# Patient Record
Sex: MALE | Race: WHITE | NOT HISPANIC OR LATINO | Employment: UNEMPLOYED | URBAN - METROPOLITAN AREA
[De-identification: names, ages, dates, MRNs, and addresses within clinical notes are randomized per-mention and may not be internally consistent; named-entity substitution may affect disease eponyms.]

---

## 2017-02-09 ENCOUNTER — E-VISIT (OUTPATIENT)
Dept: INTERNAL MEDICINE | Facility: CLINIC | Age: 52
End: 2017-02-09
Payer: COMMERCIAL

## 2017-02-09 ENCOUNTER — VIRTUAL VISIT (OUTPATIENT)
Dept: FAMILY MEDICINE | Facility: OTHER | Age: 52
End: 2017-02-09

## 2017-02-09 DIAGNOSIS — J01.01 ACUTE RECURRENT MAXILLARY SINUSITIS: Primary | ICD-10-CM

## 2017-02-09 PROCEDURE — 99444 ZZC PHYSICIAN ONLINE EVALUATION & MANAGEMENT SERVICE: CPT | Performed by: INTERNAL MEDICINE

## 2017-02-09 NOTE — PROGRESS NOTES
Date:   Clinician: Daisy Denson  Clinician NPI: [id number]  Patient: Chaparro Hanson  Patient : 1965  Patient Address: [address], [place] [address]  Patient Phone: [phone]  Visit Protocol: URI  Patient Summary:  Chaparro is a 52 year old ( : 1965 ) male who initiated a Zip for significant cough.      His symptoms started gradually 3-6 days ago and consist of malaise, nasal congestion, hoarse voice, cough, and post-nasal drainage.   He denies rhinitis, dyspnea, loss of appetite, facial pain or pressure, headache, fever, chills, ear pain, sore throat, chest pain, myalgias, itchy eyes, vomiting, nausea, and dysphagia. He denies a history of facial surgery.   His moderate nasal secretions are green. When asked to feel his neck he could not tell if lymph nodes were enlarged. He denies axillary lymphadenopathy.   Chaparro denies asthma. His severe (cough every 1-2 min) productive cough is more bothersome at night. He believes the cough is caused by post-nasal drainage. His cough produces green sputum.    Chaparro denies having COPD or other chronic lung disease.   Pulse: self-reported pulse rate as: 11 beats in 10 seconds.    Weight (in lbs): 215   Chaparro does not smoke or use smokeless tobacco.  MEDICATIONS:  Phenylephrine (Sudafed PE) and guaifenesin + dextromethorphan (Robitussin DM, Mytussin DM, Mucinex DM)   Patient free text response:  Sumatriptan 100mg  , ALLERGIES:   penicillin/amoxicillin/augmentin    Clinician Response:  Dear Chaparro,  Based on the information you have provided, I am unable to diagnose and treat you without additional information. Please be seen in a clinic or urgent care to determine the treatment that is best for you. You will not be charged for this Zip. Thanks for using Zip[employer].   Diagnosis: Unable to diagnose  Diagnosis ICD: R69  Additional Clinician Notes: Please be seen in the clinic for further evaluation. A provider will be able to listen to  your lungs to determine the best treatment for you.

## 2017-02-10 RX ORDER — DOXYCYCLINE 100 MG/1
100 CAPSULE ORAL 2 TIMES DAILY
Qty: 20 CAPSULE | Refills: 0 | Status: SHIPPED | OUTPATIENT
Start: 2017-02-10 | End: 2018-04-03

## 2017-02-10 RX ORDER — FLUTICASONE PROPIONATE 50 MCG
1-2 SPRAY, SUSPENSION (ML) NASAL DAILY
Qty: 1 BOTTLE | Refills: 11 | Status: SHIPPED | OUTPATIENT
Start: 2017-02-10 | End: 2020-08-24

## 2017-02-10 NOTE — TELEPHONE ENCOUNTER
Left V/m for pt letting him know that PCP escribed these to pharmacy and to f/u if not better after that

## 2017-02-10 NOTE — TELEPHONE ENCOUNTER
Will call in a nasal spray and antibiotic.   Needs follow up if not better.   Clinically symptoms of sinusitis.

## 2017-03-28 ENCOUNTER — OFFICE VISIT (OUTPATIENT)
Dept: PODIATRY | Facility: CLINIC | Age: 52
End: 2017-03-28
Payer: COMMERCIAL

## 2017-03-28 VITALS — HEIGHT: 74 IN | BODY MASS INDEX: 28.88 KG/M2 | WEIGHT: 225 LBS | HEART RATE: 78 BPM

## 2017-03-28 DIAGNOSIS — L60.0 INGROWING NAIL: Primary | ICD-10-CM

## 2017-03-28 PROCEDURE — 11719 TRIM NAIL(S) ANY NUMBER: CPT | Performed by: PODIATRIST

## 2017-03-28 PROCEDURE — 99212 OFFICE O/P EST SF 10 MIN: CPT | Mod: 25 | Performed by: PODIATRIST

## 2017-03-28 NOTE — MR AVS SNAPSHOT
"              After Visit Summary   3/28/2017    Chaparro Hanson    MRN: [id number]           Patient Information     Date Of Birth          1965        Visit Information        Provider Department      3/28/2017 10:15 AM Igor Peterson DPM [facility]        Today's Diagnoses     Ingrowing nail    -  1       Follow-ups after your visit        Who to contact     If you have questions or need follow up information about today's clinic visit or your schedule please contact [facility] directly at [phone].  Normal or non-critical lab and imaging results will be communicated to you by Upward Mobilityhart, letter or phone within 4 business days after the clinic has received the results. If you do not hear from us within 7 days, please contact the clinic through VeryLastRoomt or phone. If you have a critical or abnormal lab result, we will notify you by phone as soon as possible.  Submit refill requests through AgeneBio or call your pharmacy and they will forward the refill request to us. Please allow 3 business days for your refill to be completed.          Additional Information About Your Visit        MyChart Information     [employer] gives you secure access to your electronic health record. If you see a primary care provider, you can also send messages to your care team and make appointments. If you have questions, please call your primary care clinic.  If you do not have a primary care provider, please call [phone] and they will assist you.        Care EveryWhere ID     This is your Care EveryWhere ID. This could be used by other organizations to access your [facility] medical records  [id number]        Your Vitals Were     Pulse Height BMI (Body Mass Index)             78 6' 2\" (1.88 m) 28.89 kg/m2          Blood Pressure from Last 3 Encounters:   07/25/16 124/72   07/05/16 122/70   11/24/15 116/72    Weight from Last 3 Encounters:   03/28/17 225 lb (102.1 kg)   07/25/16 224 lb 12.8 oz (102 " kg)   07/05/16 224 lb 12.8 oz (102 kg)              We Performed the Following     TRIM NONDYSTRPHIC NAIL(S)        Primary Care Provider Office Phone # Fax #    Miky Messer [phone]       [facility] [address] [facility] [address]        Thank you!     Thank you for choosing [facility]  for your care. Our goal is always to provide you with excellent care. Hearing back from our patients is one way we can continue to improve our services. Please take a few minutes to complete the written survey that you may receive in the mail after your visit with us. Thank you!             Your Updated Medication List - Protect others around you: Learn how to safely use, store and throw away your medicines at www.disposemymeds.org.          This list is accurate as of: 3/28/17 10:58 AM.  Always use your most recent med list.                   Brand Name Dispense Instructions for use    doxycycline 100 MG capsule    VIBRAMYCIN    20 capsule    Take 1 capsule (100 mg) by mouth 2 times daily       fluticasone 50 MCG/ACT spray    FLONASE    1 Bottle    Spray 1-2 sprays into both nostrils daily       SUMAtriptan 100 MG tablet    IMITREX     Take by mouth at onset of headache for migraine       VITAMIN D3 PO      Take by mouth daily

## 2017-03-28 NOTE — LETTER
"  3/28/2017       RE: Chaparro Hanson  [address]  [facility] [address]           Dear Colleague,    Thank you for referring your patient, Chaparro Hanson, to the [facility]. Please see a copy of my visit note below.    Foot & Ankle Surgery   March 28, 2017    S:  Pt is seen today for evaluation of ingrown nail bilateral border L 2nd toe.  Previous P&A medial L hallux last year, doing well.      Vitals:    03/28/17 1012   Pulse: 78   Weight: 102.1 kg (225 lb)   Height: 1.88 m (6' 2\")   '      ROS - Pos for CC.  Patient denies current nausea, vomiting, chills, fevers, belly pain, calf pain, chest pain or SOB.  Complete remainder of ROS it otherwise neg.      PE:  Gen:   No apparent distress  Neuro:   A&Ox3, no deficits  Psych:    Answering questions appropriately for age and situation with normal affect  Head:    NCAT  Eye:    Visual scanning without deficit  Ear:    Response to auditory stimuli wnl  Lung:    Non-labored breathing on RA noted  Abd:    NTND per patient report  Lymph:    Neg for pitting/non-pitting edema BLE  Vasc:    Pulses palpable, CFT minimally delayed  Neuro:    Light touch sensation intact to all sensory nerve distributions without paresthesias  Derm:    Incurvated bilateral border L 2nd nail with small wounds noted after debridement; however, minimal inflammation and no SOI  MSK:    ROM, strength wnl without limitation, no pain on palpation noted.  Calf:    Neg for redness, swelling or tenderness      Assessment:  52 year old male with ingrown bilateral border L 2nd toenail      Plan:  Discussed etiologies/options  1.  Ingrown L 2nd nail  -discussed total permanent avulsion vs slantbacks.  He elected to go with slantbacks today.  Leading edge of nail was cut and removed with nail clipper.  Demonstrated for home debridement.   -total permanent avulsion as next step    Follow up:  prn      Body mass index is 28.89 kg/(m^2).  Weight management plan: Patient was referred to their PCP to " discuss a diet and exercise plan.         Igor Peterson DPM   Podiatric Foot & Ankle Surgeon  [facility]  [phone]    Again, thank you for allowing me to participate in the care of your patient.        Sincerely,    Igor Peterson DPM, DPM

## 2017-03-28 NOTE — PROGRESS NOTES
"Foot & Ankle Surgery   March 28, 2017    S:  Pt is seen today for evaluation of ingrown nail bilateral border L 2nd toe.  Previous P&A medial L hallux last year, doing well.      Vitals:    03/28/17 1012   Pulse: 78   Weight: 102.1 kg (225 lb)   Height: 1.88 m (6' 2\")   '      ROS - Pos for CC.  Patient denies current nausea, vomiting, chills, fevers, belly pain, calf pain, chest pain or SOB.  Complete remainder of ROS it otherwise neg.      PE:  Gen:   No apparent distress  Neuro:   A&Ox3, no deficits  Psych:    Answering questions appropriately for age and situation with normal affect  Head:    NCAT  Eye:    Visual scanning without deficit  Ear:    Response to auditory stimuli wnl  Lung:    Non-labored breathing on RA noted  Abd:    NTND per patient report  Lymph:    Neg for pitting/non-pitting edema BLE  Vasc:    Pulses palpable, CFT minimally delayed  Neuro:    Light touch sensation intact to all sensory nerve distributions without paresthesias  Derm:    Incurvated bilateral border L 2nd nail with small wounds noted after debridement; however, minimal inflammation and no SOI  MSK:    ROM, strength wnl without limitation, no pain on palpation noted.  Calf:    Neg for redness, swelling or tenderness      Assessment:  52 year old male with ingrown bilateral border L 2nd toenail      Plan:  Discussed etiologies/options  1.  Ingrown L 2nd nail  -discussed total permanent avulsion vs slantbacks.  He elected to go with slantbacks today.  Leading edge of nail was cut and removed with nail clipper.  Demonstrated for home debridement.   -total permanent avulsion as next step    Follow up:  prn      Body mass index is 28.89 kg/(m^2).  Weight management plan: Patient was referred to their PCP to discuss a diet and exercise plan.         Igor Peterson DPM   Podiatric Foot & Ankle Surgeon  [facility]  [phone]  "

## 2017-03-28 NOTE — NURSING NOTE
"Chief Complaint   Patient presents with     Ingrown Toenail     L 2nd nail, wants edges removed, bothering him for awhile       Initial Pulse 78  Ht 1.88 m (6' 2\")  Wt 102.1 kg (225 lb)  BMI 28.89 kg/m2 Estimated body mass index is 28.89 kg/(m^2) as calculated from the following:    Height as of this encounter: 1.88 m (6' 2\").    Weight as of this encounter: 102.1 kg (225 lb).  Medication Reconciliation: complete  "

## 2017-11-12 ENCOUNTER — E-VISIT (OUTPATIENT)
Dept: INTERNAL MEDICINE | Facility: CLINIC | Age: 52
End: 2017-11-12
Payer: COMMERCIAL

## 2017-11-12 DIAGNOSIS — R05.9 COUGH: Primary | ICD-10-CM

## 2018-04-03 ENCOUNTER — OFFICE VISIT (OUTPATIENT)
Dept: DERMATOLOGY | Facility: CLINIC | Age: 53
End: 2018-04-03
Payer: COMMERCIAL

## 2018-04-03 VITALS — SYSTOLIC BLOOD PRESSURE: 134 MMHG | OXYGEN SATURATION: 96 % | HEART RATE: 69 BPM | DIASTOLIC BLOOD PRESSURE: 87 MMHG

## 2018-04-03 DIAGNOSIS — L73.8 SEBACEOUS GLAND HYPERPLASIA: ICD-10-CM

## 2018-04-03 DIAGNOSIS — L13.0: Primary | ICD-10-CM

## 2018-04-03 DIAGNOSIS — K90.0 CELIAC DISEASE: ICD-10-CM

## 2018-04-03 DIAGNOSIS — L40.9 PSORIASIS: ICD-10-CM

## 2018-04-03 PROCEDURE — 99214 OFFICE O/P EST MOD 30 MIN: CPT | Performed by: PHYSICIAN ASSISTANT

## 2018-04-03 RX ORDER — FLUOCINONIDE 0.5 MG/G
CREAM TOPICAL 2 TIMES DAILY
Qty: 60 G | Refills: 2 | Status: SHIPPED | OUTPATIENT
Start: 2018-04-03 | End: 2020-08-24

## 2018-04-03 NOTE — LETTER
4/3/2018         RE: Chaparro Hanson  [address]  [facility] [address]        Dear Colleague,    Thank you for referring your patient, Chaparro Hanson, to the [facility]. Please see a copy of my visit note below.    HPI:   Chaparro Hanson is a 53 year old male who presents for evaluation of dermatitis herpetiformis. He has been gluten free for 5 years, and tries to avoid cross contamination.   chief complaint  Location: elbows, knees    Condition present for:  years.   Previous treatments include: Dapsone - intermittent use with flare ups of DH, topical steroid - helpful    He has psoriasis on hands with recent flare up. He applies topical steroid with relief in itching.     Review Of Systems  Eyes: negative  Ears/Nose/Throat: negative  Respiratory: No shortness of breath, dyspnea on exertion, cough, or hemoptysis  Cardiovascular: negative  Gastrointestinal: negative  Genitourinary: negative  Musculoskeletal: negative  Neurologic: negative  Psychiatric: negative    Social history: he is able to work from home     This document serves as a record of the services and decisions personally performed and made by Yesenia Aranda, MS, PA-C. It was created on her behalf by Cristin Kauffman, a trained medical scribe. The creation of this document is based on the provider's statements to the medical scribe.  Cristin Kauffman 2:12 PM April 3, 2018    PHYSICAL EXAM:      Skin exam performed as follows: Type 2 skin. Mood appropriate  Alert and Oriented X 3. Well developed, well nourished in no distress.  General appearance: Normal  Head including face: Normal  Eyes: conjunctiva and lids: Normal  Mouth: Lips, teeth, gums: Normal  Neck: Normal  Chest-breast/axillae: Normal  Back: Normal  Spleen and liver: Normal  Cardiovascular: Exam of peripheral vascular system by observation for swelling, varicosities, edema: Normal  Genitalia: groin, buttocks: Normal  Extremities: digits/nails (clubbing):  Normal  Eccrine and Apocrine glands: Normal  Right upper extremity: Normal  Left upper extremity: Normal  Right lower extremity: Normal  Left lower extremity: Normal  Skin: Scalp and body hair: See below    1. Psoriasiform dermatitis on bilateral dorsa of hands  2. Waxy pink/yellow papules on the face    ASSESSMENT/PLAN:     1. Dermatitis herpetiformis - advised of diagnosis and treatment options. Confirmed diagnosis of Celiac disease. He has followed a gluten free diet for 5 years. He uses Dapsone as needed for flare ups. He is not currently following with GI specialist. He has noticed weight gain and abdominal fullness since switching to gluten free diet. Patient will let me know if needing course of dapsone for flare ups. Referral provided for GI consult; advised he should be seeing a GI specialist regularly.  2. Plaque psoriasis on back of hands - advised on chronic, inflammatory, autoimmune disorder. Approx 1% TBSA. Denies any joint sx. Has tried topical steroids in the past. Discussed treatment options including ILK, NB-UVB. Patient would like to proceed with NB-UVB. Codes given and he will check on insurance coverage prior to starting.   --Start Lidex cream at bedtime  --NBUVB 2-3x per week (hands only) -  mJ; skin type 2   3. Sebaceous hyperplasia located on face. Advised benign. Discussed cautery if desired; advised on approximate cost if not covered, $150.       Follow-up: PRN/NBUVB  CC:   Scribed By: Cristin Kauffman Medical Scribe    The information in this document, created by the medical scribe for me, accurately reflects the services I personally performed and the decisions made by me. I have reviewed and approved this document for accuracy prior to leaving the patient care area.  April 3, 2018 2:49 PM    Yesenia Aranda, MS, PAGENO      Again, thank you for allowing me to participate in the care of your patient.        Sincerely,        Yesenia Aranda PA-C

## 2018-04-03 NOTE — MR AVS SNAPSHOT
After Visit Summary   4/3/2018    Chaparro Hanson    MRN: [id number]           Patient Information     Date Of Birth          1965        Visit Information        Provider Department      4/3/2018 2:00 PM Yesenia Aranda PA-C [facility]        Today's Diagnoses     Dermatosis herpetiformis    -  1    Psoriasis        Sebaceous gland hyperplasia        Celiac disease          Care Instructions    Check with insurance whether they would cover Narrowband UVB treatment for psoriasis   Procedure code: 00730   Diagnosis code: 40.9    You can consider cautery for sebaceous gland hyperplasia. Approximate cost of $150.     Apply Lidex creams to hands at bedtime           Follow-ups after your visit        Additional Services     GASTROENTEROLOGY ADULT REF CONSULT ONLY       Preferred Location: [place] [facility] [phone]      Please be aware that coverage of these services is subject to the terms and limitations of your health insurance plan.  Call member services at your health plan with any benefit or coverage questions.  Any procedures must be performed at a [place] facility OR coordinated by your clinic's referral office.    Please bring the following with you to your appointment:    (1) Any X-Rays, CTs or MRIs which have been performed.  Contact the facility where they were done to arrange for  prior to your scheduled appointment.    (2) List of current medications   (3) This referral request   (4) Any documents/labs given to you for this referral                  Your next 10 appointments already scheduled     May 10, 2018  8:45 AM CDT   New Visit with Henry Dawson MD   [facility]    [address] [address] [phone]              Who to contact     If you have questions or need follow up information about today's clinic visit or your schedule please contact [facility] directly at [phone].  Normal or non-critical lab and imaging results will be communicated to you by MyChart, letter or phone within 4 business days after the clinic has received the results. If you do not hear from us within 7 days, please contact the clinic through Empower2adapthart or phone. If you have a critical or abnormal lab result, we will notify you by phone as soon as possible.  Submit refill requests through iPositioning or call your pharmacy and they will forward the refill request to us. Please allow 3 business days for your refill to be completed.          Additional Information About Your Visit        Empower2adapthart Information     iPositioning gives you secure access to your electronic health record. If you see a primary care provider, you can also send messages to your care team and make appointments. If you have questions, please call your primary care clinic.  If you do not have a primary care provider, please call [phone] and they will assist you.        Care EveryWhere ID     This is your Care EveryWhere ID. This could be used by other organizations to access your [facility] medical records  [id number]        Your Vitals Were     Pulse Pulse Oximetry                69 96%           Blood Pressure from Last 3 Encounters:   04/03/18 134/87   07/25/16 124/72   07/05/16 122/70    Weight from Last 3 Encounters:   03/28/17 102.1 kg (225 lb)   07/25/16 102 kg (224 lb 12.8 oz)   07/05/16 102 kg (224 lb 12.8 oz)              We Performed the Following     GASTROENTEROLOGY ADULT REF CONSULT ONLY          Today's Medication Changes          These changes are accurate as of 4/3/18  2:30 PM.  If you have any questions, ask your nurse or doctor.               Start taking these medicines.        Dose/Directions    fluocinonide 0.05 % cream   Commonly known as:  LIDEX   Used for:  Psoriasis   Started by:  Yesenia Aranda PA-C        Apply topically 2 times daily   Quantity:  60 g   Refills:  2             Where to get your medicines      These medications were sent to [facility] - [place], [place] - [address] AT [address] of [facility] [address]  [address], [facility] [address]     Phone:  [phone]     fluocinonide 0.05 % cream                Primary Care Provider Office Phone # Fax #    Miky Messer [phone]       [address]  [facility] [address]        Equal Access to Services     ELISA FARIA : Hadii aad ku hadasho Soomaali, waaxda luqadaha, qaybta kaalmada adeegyada, waxay idiin hayaan adeeg kharash lasally . So [facility] [phone].    ATENCIÓN: Si habla español, tiene a kowalski disposición servicios gratuitos de asistencia lingüística. [facility] [phone].    We comply with applicable federal civil rights laws and [place] laws. We do not discriminate on the basis of race, color, national origin, age, disability, sex, sexual orientation, or gender identity.            Thank you!     Thank you for choosing [facility]  for your care. Our goal is always to provide you with excellent care. Hearing back from our patients is one way we can continue to improve our services. Please take a few minutes to complete the written survey that you may receive in the mail after your visit with us. Thank you!             Your Updated Medication List - Protect others around you: Learn how to safely use, store and throw away your medicines at www.disposemymeds.org.          This list is accurate as of 4/3/18  2:30 PM.  Always use your most recent med list.                   Brand Name Dispense Instructions for use Diagnosis    fluocinonide 0.05 % cream    LIDEX    60 g    Apply topically 2 times daily    Psoriasis       fluticasone 50 MCG/ACT spray    FLONASE    1 Bottle    Spray 1-2 sprays into both nostrils daily    Acute recurrent maxillary sinusitis       SUMAtriptan 100 MG tablet    IMITREX     Take by mouth at onset of headache  for migraine        VITAMIN D3 PO      Take by mouth daily

## 2018-04-03 NOTE — NURSING NOTE
"Chief Complaint   Patient presents with     Derm Problem       Initial /87  Pulse 69  SpO2 96% Estimated body mass index is 28.89 kg/(m^2) as calculated from the following:    Height as of 3/28/17: 1.88 m (6' 2\").    Weight as of 3/28/17: 102.1 kg (225 lb).  Medication Reconciliation: complete    "

## 2018-04-03 NOTE — PATIENT INSTRUCTIONS
Check with insurance whether they would cover Narrowband UVB treatment for psoriasis   Procedure code: 51545   Diagnosis code: 40.9    You can consider cautery for sebaceous gland hyperplasia. Approximate cost of $150.     Apply Lidex creams to hands at bedtime

## 2018-04-03 NOTE — PROGRESS NOTES
HPI:   Chaparro Hanson is a 53 year old male who presents for evaluation of dermatitis herpetiformis. He has been gluten free for 5 years, and tries to avoid cross contamination.   chief complaint  Location: elbows, knees    Condition present for:  years.   Previous treatments include: Dapsone - intermittent use with flare ups of DH, topical steroid - helpful    He has psoriasis on hands with recent flare up. He applies topical steroid with relief in itching.     Review Of Systems  Eyes: negative  Ears/Nose/Throat: negative  Respiratory: No shortness of breath, dyspnea on exertion, cough, or hemoptysis  Cardiovascular: negative  Gastrointestinal: negative  Genitourinary: negative  Musculoskeletal: negative  Neurologic: negative  Psychiatric: negative    Social history: he is able to work from home     This document serves as a record of the services and decisions personally performed and made by Yesenia Aranda, MS, PA-C. It was created on her behalf by Cristin Kauffman, a trained medical scribe. The creation of this document is based on the provider's statements to the medical scribe.  Cristin Kauffman 2:12 PM April 3, 2018    PHYSICAL EXAM:      Skin exam performed as follows: Type 2 skin. Mood appropriate  Alert and Oriented X 3. Well developed, well nourished in no distress.  General appearance: Normal  Head including face: Normal  Eyes: conjunctiva and lids: Normal  Mouth: Lips, teeth, gums: Normal  Neck: Normal  Chest-breast/axillae: Normal  Back: Normal  Spleen and liver: Normal  Cardiovascular: Exam of peripheral vascular system by observation for swelling, varicosities, edema: Normal  Genitalia: groin, buttocks: Normal  Extremities: digits/nails (clubbing): Normal  Eccrine and Apocrine glands: Normal  Right upper extremity: Normal  Left upper extremity: Normal  Right lower extremity: Normal  Left lower extremity: Normal  Skin: Scalp and body hair: See below    1. Psoriasiform dermatitis on bilateral dorsa  of hands  2. Waxy pink/yellow papules on the face    ASSESSMENT/PLAN:     1. Dermatitis herpetiformis - advised of diagnosis and treatment options. Confirmed diagnosis of Celiac disease. He has followed a gluten free diet for 5 years. He uses Dapsone as needed for flare ups. He is not currently following with GI specialist. He has noticed weight gain and abdominal fullness since switching to gluten free diet. Patient will let me know if needing course of dapsone for flare ups. Referral provided for GI consult; advised he should be seeing a GI specialist regularly.  2. Plaque psoriasis on back of hands - advised on chronic, inflammatory, autoimmune disorder. Approx 1% TBSA. Denies any joint sx. Has tried topical steroids in the past. Discussed treatment options including ILK, NB-UVB. Patient would like to proceed with NB-UVB. Codes given and he will check on insurance coverage prior to starting.   --Start Lidex cream at bedtime  --NBUVB 2-3x per week (hands only) -  mJ; skin type 2   3. Sebaceous hyperplasia located on face. Advised benign. Discussed cautery if desired; advised on approximate cost if not covered, $150.       Follow-up: PRN/NBUVB  CC:   Scribed By: Cristin Kauffman, Medical Scribe    The information in this document, created by the medical scribe for me, accurately reflects the services I personally performed and the decisions made by me. I have reviewed and approved this document for accuracy prior to leaving the patient care area.  April 3, 2018 2:49 PM    Yesenia Aranda MS, PAGENO

## 2018-12-13 ENCOUNTER — E-VISIT (OUTPATIENT)
Dept: INTERNAL MEDICINE | Facility: CLINIC | Age: 53
End: 2018-12-13
Payer: COMMERCIAL

## 2018-12-13 DIAGNOSIS — J06.9 VIRAL URI: Primary | ICD-10-CM

## 2018-12-13 PROCEDURE — 99444 ZZC PHYSICIAN ONLINE EVALUATION & MANAGEMENT SERVICE: CPT | Performed by: INTERNAL MEDICINE

## 2018-12-13 RX ORDER — GUAIFENESIN 1200 MG/1
1200 TABLET, EXTENDED RELEASE ORAL 2 TIMES DAILY
Qty: 60 TABLET | Refills: 0 | Status: SHIPPED | OUTPATIENT
Start: 2018-12-13 | End: 2020-08-24

## 2018-12-13 RX ORDER — FLUTICASONE PROPIONATE 50 MCG
1 SPRAY, SUSPENSION (ML) NASAL DAILY
Qty: 9.9 ML | Refills: 1 | Status: SHIPPED | OUTPATIENT
Start: 2018-12-13 | End: 2019-12-13

## 2019-03-18 ENCOUNTER — E-VISIT (OUTPATIENT)
Dept: INTERNAL MEDICINE | Facility: CLINIC | Age: 54
End: 2019-03-18

## 2019-03-18 DIAGNOSIS — H10.12 ACUTE ALLERGIC CONJUNCTIVITIS OF LEFT EYE: Primary | ICD-10-CM

## 2019-03-18 PROCEDURE — 99444 ZZC PHYSICIAN ONLINE EVALUATION & MANAGEMENT SERVICE: CPT | Performed by: INTERNAL MEDICINE

## 2019-03-18 RX ORDER — OLOPATADINE HYDROCHLORIDE 1 MG/ML
1 SOLUTION/ DROPS OPHTHALMIC 2 TIMES DAILY
Qty: 5 ML | Refills: 0 | Status: SHIPPED | OUTPATIENT
Start: 2019-03-18 | End: 2020-08-24

## 2019-03-18 RX ORDER — LORATADINE 10 MG/1
10 TABLET ORAL DAILY
Qty: 14 TABLET | Refills: 1 | Status: SHIPPED | OUTPATIENT
Start: 2019-03-18 | End: 2020-08-24

## 2019-03-18 NOTE — TELEPHONE ENCOUNTER
Symptoms are consistent with allergic conjunctivitis, or early viral infection.   Suggest symptomatic treatment, start on daily Claritin and Patanol eye drops. Will call in to pharmacy.

## 2019-10-01 ENCOUNTER — VIRTUAL VISIT (OUTPATIENT)
Dept: FAMILY MEDICINE | Facility: OTHER | Age: 54
End: 2019-10-01

## 2019-10-01 NOTE — PROGRESS NOTES
"Date:   Clinician: Faith Maria  Clinician NPI: [id number]  Patient: Chaparro Hanson  Patient : 1965  Patient Address: [address], [place] [address]  Patient Phone: [phone]  Visit Protocol: URI  Patient Summary:  Chaparro is a 54 year old ( : 1965 ) male who initiated a Visit for cold, sinus infection, or influenza. When asked the question \"Please sign me up to receive news, health information and promotions. \", Chaparro responded \"No\".    Chaparro states his symptoms started gradually 7-9 days ago. After his symptoms started, they improved and then got worse again.   His symptoms consist of a cough.   Symptom details   Cough: Chaparro coughs a few times an hour and his cough is not more bothersome at night. Phlegm comes into his throat when he coughs. He believes the phlegm causes the cough. The color of the phlegm is yellow and clear.    Chaparro denies having rhinitis, wheezing, teeth pain, headache, sore throat, malaise, fever, facial pain or pressure, myalgias, enlarged lymph nodes, chills, nasal congestion, and ear pain. He also denies having recent facial or sinus surgery in the past 60 days and taking antibiotic medication for the symptoms. He is not experiencing dyspnea.   Precipitating events  He has not recently been exposed to someone with influenza. Chaparro has been in close contact with the following high risk individuals: adults 65 or older.   Pertinent medical history  Weight: 210 lbs   Chaparro does not smoke or use smokeless tobacco.   Additional information as reported by the patient (free text): Celiac disease     MEDICATIONS: sumatriptan oral, ALLERGIES: Penicillins  Clinician Response:  Dear Chaparro,  I am sorry you are not feeling well. To determine the most appropriate care for you, I would like you to be seen in person to further discuss your health history and symptoms.  You will not be charged for this Visit. Thank you for trusting us with your " care.   Diagnosis: Refer for additional evaluation  Diagnosis ICD: R69

## 2020-03-02 ENCOUNTER — HEALTH MAINTENANCE LETTER (OUTPATIENT)
Age: 55
End: 2020-03-02

## 2020-03-24 ENCOUNTER — OFFICE VISIT (OUTPATIENT)
Dept: DERMATOLOGY | Facility: CLINIC | Age: 55
End: 2020-03-24
Payer: COMMERCIAL

## 2020-03-24 VITALS — HEART RATE: 83 BPM | OXYGEN SATURATION: 95 % | DIASTOLIC BLOOD PRESSURE: 71 MMHG | SYSTOLIC BLOOD PRESSURE: 138 MMHG

## 2020-03-24 DIAGNOSIS — L81.4 LENTIGO: ICD-10-CM

## 2020-03-24 DIAGNOSIS — D22.9 NEVUS: ICD-10-CM

## 2020-03-24 DIAGNOSIS — L73.8 SEBACEOUS HYPERPLASIA: ICD-10-CM

## 2020-03-24 DIAGNOSIS — Z41.1 ELECTIVE PROCEDURE FOR UNACCEPTABLE COSMETIC APPEARANCE: ICD-10-CM

## 2020-03-24 DIAGNOSIS — L82.1 SEBORRHEIC KERATOSIS: ICD-10-CM

## 2020-03-24 DIAGNOSIS — L40.9 PSORIASIS: Primary | ICD-10-CM

## 2020-03-24 DIAGNOSIS — D18.01 ANGIOMA OF SKIN: ICD-10-CM

## 2020-03-24 PROCEDURE — 96999 UNLISTED SPEC DERM SVC/PX: CPT | Performed by: PHYSICIAN ASSISTANT

## 2020-03-24 PROCEDURE — 99214 OFFICE O/P EST MOD 30 MIN: CPT | Performed by: PHYSICIAN ASSISTANT

## 2020-03-24 RX ORDER — BETAMETHASONE DIPROPIONATE 0.05 %
OINTMENT (GRAM) TOPICAL
Qty: 50 G | Refills: 3 | Status: SHIPPED | OUTPATIENT
Start: 2020-03-24 | End: 2020-10-12

## 2020-03-24 RX ORDER — BIOTIN 1 MG
1000 TABLET ORAL DAILY
COMMUNITY

## 2020-03-24 RX ORDER — CHLORAL HYDRATE 500 MG
1 CAPSULE ORAL DAILY
COMMUNITY

## 2020-03-24 NOTE — LETTER
3/24/2020         RE: Chaparro Hanson  [address]  [facility] [address]        Dear Colleague,    Thank you for referring your patient, Chaparro Hanson, to the [facility]. Please see a copy of my visit note below.    HPI:   Chief complaints: Chaparro Hanson is a 55 year old male who presents for Full skin cancer screening to rule out skin cancer   Last Skin Exam: many years ago      1st Baseline: no  Personal HX of Skin Cancer: no   Personal HX of Malignant Melanoma: no   Family HX of Skin Cancer / Malignant Melanoma: no  Personal HX of Atypical Moles:   no  Risk factors: history of sunburns and sun exposure  New / Changing lesions:yes persistent red spots on the face. Also has a history of psoriasis and dermatitis herpetiforms. Psoriasis seems to be flaring.   Social History: Working from home  On review of systems, there are no further skin complaints, patient is feeling otherwise well.  See patient intake sheet.  ROS of the following were done and are negative: Constitutional, Eyes, Ears, Nose,   Mouth, Throat, Cardiovascular, Respiratory, GI, Genitourinary, Musculoskeletal,   Psychiatric, Endocrine, Allergic/Immunologic.    PHYSICAL EXAM:   /71   Pulse 83   SpO2 95%   Skin exam performed as follows: Type 2 skin. Mood appropriate  Alert and Oriented X 3. Well developed, well nourished in no distress.  General appearance: Normal  Head including face: Normal  Eyes: conjunctiva and lids: Normal  Mouth: Lips, teeth, gums: Normal  Neck: Normal  Chest-breast/axillae: Normal  Back: Normal  Spleen and liver: Normal  Cardiovascular: Exam of peripheral vascular system by observation for swelling, varicosities, edema: Normal  Genitalia: groin, buttocks: Normal  Extremities: digits/nails (clubbing): Normal  Eccrine and Apocrine glands: Normal  Right upper extremity: Normal  Left upper extremity: Normal  Right lower extremity: Normal  Left lower extremity: Normal  Skin: Scalp and body hair:  See below    Pt deferred exam of breasts, groin, buttocks: No    Other physical findings:  1. Multiple pigmented macules on extremities and trunk  2. Multiple pigmented macules on face, trunk and extremities  3. Multiple vascular papules on trunk, arms and legs  4. Multiple scattered keratotic plaques       Except as noted above, no other signs of skin cancer or melanoma.     ASSESSMENT/PLAN:   Benign Full skin cancer screening today. . Patient with history of none  Advised on monthly self exams and 1 year  Patient Education: Appropriate brochures given.    1. Multiple benign appearing nevi on arms, legs and trunk. Discussed ABCDEs of melanoma and sunscreen.   2. Multiple lentigos on arms, legs and trunk. Advised benign, no treatment needed.  3. Multiple scattered angiomas. Advised benign, no treatment needed.   4. Seborrheic keratosis on arms, legs and trunk. Advised benign, no treatment needed.  5. Plaque psoriasis - advised on chronic, inflammatory, autoimmune disorder. Has been using TAC but is only somewhat helpful. Start betamethasone ointment BID x 2-3 weeks then PRN only.   6. Sebaceous gland hyperplasia - treated with cautery today at a setting of 5.0. BLT applied prior to procedure. Patient tolerated well, cosmetic charge of $150.   7. History of Dermatitis herpetiformis - advised of diagnosis and treatment options. Confirmed diagnosis of Celiac disease. He has followed a gluten free diet for 5 years. He uses Dapsone as needed for flare ups. He is not currently following with GI specialist. He has noticed weight gain and abdominal fullness since switching to gluten free diet. Patient will let me know if needing course of dapsone for flare ups. Referral provided for GI consult; advised he should be seeing a GI specialist regularly              Follow-up: yearly FSE/PRN sooner    1.) Patient was asked about new and changing moles. YES  2.) Patient received a complete physical skin examination: YES  3.)  Patient was counseled to perform a monthly self skin examination: YES  Scribed By: Yesenia Aranda, MS, PAMaria [facility]        Again, thank you for allowing me to participate in the care of your patient.        Sincerely,        LASHON CainC

## 2020-03-24 NOTE — PROGRESS NOTES
HPI:   Chief complaints: Chaparro Hanson is a 55 year old male who presents for Full skin cancer screening to rule out skin cancer   Last Skin Exam: many years ago      1st Baseline: no  Personal HX of Skin Cancer: no   Personal HX of Malignant Melanoma: no   Family HX of Skin Cancer / Malignant Melanoma: no  Personal HX of Atypical Moles:   no  Risk factors: history of sunburns and sun exposure  New / Changing lesions:yes persistent red spots on the face. Also has a history of psoriasis and dermatitis herpetiforms. Psoriasis seems to be flaring.   Social History: Working from home  On review of systems, there are no further skin complaints, patient is feeling otherwise well.  See patient intake sheet.  ROS of the following were done and are negative: Constitutional, Eyes, Ears, Nose,   Mouth, Throat, Cardiovascular, Respiratory, GI, Genitourinary, Musculoskeletal,   Psychiatric, Endocrine, Allergic/Immunologic.    PHYSICAL EXAM:   /71   Pulse 83   SpO2 95%   Skin exam performed as follows: Type 2 skin. Mood appropriate  Alert and Oriented X 3. Well developed, well nourished in no distress.  General appearance: Normal  Head including face: Normal  Eyes: conjunctiva and lids: Normal  Mouth: Lips, teeth, gums: Normal  Neck: Normal  Chest-breast/axillae: Normal  Back: Normal  Spleen and liver: Normal  Cardiovascular: Exam of peripheral vascular system by observation for swelling, varicosities, edema: Normal  Genitalia: groin, buttocks: Normal  Extremities: digits/nails (clubbing): Normal  Eccrine and Apocrine glands: Normal  Right upper extremity: Normal  Left upper extremity: Normal  Right lower extremity: Normal  Left lower extremity: Normal  Skin: Scalp and body hair: See below    Pt deferred exam of breasts, groin, buttocks: No    Other physical findings:  1. Multiple pigmented macules on extremities and trunk  2. Multiple pigmented macules on face, trunk and extremities  3. Multiple vascular papules on  trunk, arms and legs  4. Multiple scattered keratotic plaques       Except as noted above, no other signs of skin cancer or melanoma.     ASSESSMENT/PLAN:   Benign Full skin cancer screening today. . Patient with history of none  Advised on monthly self exams and 1 year  Patient Education: Appropriate brochures given.    1. Multiple benign appearing nevi on arms, legs and trunk. Discussed ABCDEs of melanoma and sunscreen.   2. Multiple lentigos on arms, legs and trunk. Advised benign, no treatment needed.  3. Multiple scattered angiomas. Advised benign, no treatment needed.   4. Seborrheic keratosis on arms, legs and trunk. Advised benign, no treatment needed.  5. Plaque psoriasis - advised on chronic, inflammatory, autoimmune disorder. Has been using TAC but is only somewhat helpful. Start betamethasone ointment BID x 2-3 weeks then PRN only.   6. Sebaceous gland hyperplasia - treated with cautery today at a setting of 5.0. BLT applied prior to procedure. Patient tolerated well, cosmetic charge of $150.   7. History of Dermatitis herpetiformis - advised of diagnosis and treatment options. Confirmed diagnosis of Celiac disease. He has followed a gluten free diet for 5 years. He uses Dapsone as needed for flare ups. He is not currently following with GI specialist. He has noticed weight gain and abdominal fullness since switching to gluten free diet. Patient will let me know if needing course of dapsone for flare ups. Referral provided for GI consult; advised he should be seeing a GI specialist regularly              Follow-up: yearly FSE/PRN sooner    1.) Patient was asked about new and changing moles. YES  2.) Patient received a complete physical skin examination: YES  3.) Patient was counseled to perform a monthly self skin examination: YES  Scribed By: Yesenia Aranda, MS, PA-C

## 2020-08-21 ENCOUNTER — MYC MEDICAL ADVICE (OUTPATIENT)
Dept: INTERNAL MEDICINE | Facility: CLINIC | Age: 55
End: 2020-08-21

## 2020-10-11 ASSESSMENT — ENCOUNTER SYMPTOMS
EYE PAIN: 0
HEMATOCHEZIA: 0
JOINT SWELLING: 0
DIZZINESS: 0
SHORTNESS OF BREATH: 0
DIARRHEA: 0
FREQUENCY: 0
COUGH: 0
HEARTBURN: 0
MYALGIAS: 0
HEADACHES: 1
HEMATURIA: 0
DYSURIA: 0
ABDOMINAL PAIN: 0
FEVER: 0
SORE THROAT: 0
PALPITATIONS: 0
CONSTIPATION: 0
NAUSEA: 0
ARTHRALGIAS: 0
WEAKNESS: 0
CHILLS: 0
PARESTHESIAS: 0
NERVOUS/ANXIOUS: 0

## 2020-10-12 ENCOUNTER — OFFICE VISIT (OUTPATIENT)
Dept: INTERNAL MEDICINE | Facility: CLINIC | Age: 55
End: 2020-10-12
Payer: COMMERCIAL

## 2020-10-12 VITALS
DIASTOLIC BLOOD PRESSURE: 77 MMHG | TEMPERATURE: 98.1 F | OXYGEN SATURATION: 95 % | HEART RATE: 65 BPM | WEIGHT: 209 LBS | BODY MASS INDEX: 26.82 KG/M2 | HEIGHT: 74 IN | SYSTOLIC BLOOD PRESSURE: 134 MMHG

## 2020-10-12 DIAGNOSIS — Z12.5 SCREENING FOR PROSTATE CANCER: ICD-10-CM

## 2020-10-12 DIAGNOSIS — Z00.00 ENCOUNTER FOR PREVENTATIVE ADULT HEALTH CARE EXAMINATION: Primary | ICD-10-CM

## 2020-10-12 DIAGNOSIS — K21.9 GASTROESOPHAGEAL REFLUX DISEASE WITHOUT ESOPHAGITIS: ICD-10-CM

## 2020-10-12 DIAGNOSIS — K82.4 GALL BLADDER POLYP: ICD-10-CM

## 2020-10-12 DIAGNOSIS — Z23 NEED FOR SHINGLES VACCINE: ICD-10-CM

## 2020-10-12 DIAGNOSIS — Z23 NEED FOR PROPHYLACTIC VACCINATION AND INOCULATION AGAINST INFLUENZA: ICD-10-CM

## 2020-10-12 LAB
ERYTHROCYTE [DISTWIDTH] IN BLOOD BY AUTOMATED COUNT: 13.1 % (ref 10–15)
HCT VFR BLD AUTO: 44.9 % (ref 40–53)
HGB BLD-MCNC: 15.6 G/DL (ref 13.3–17.7)
MCH RBC QN AUTO: 32.6 PG (ref 26.5–33)
MCHC RBC AUTO-ENTMCNC: 34.7 G/DL (ref 31.5–36.5)
MCV RBC AUTO: 94 FL (ref 78–100)
PLATELET # BLD AUTO: 195 10E9/L (ref 150–450)
RBC # BLD AUTO: 4.79 10E12/L (ref 4.4–5.9)
WBC # BLD AUTO: 6.6 10E9/L (ref 4–11)

## 2020-10-12 PROCEDURE — 80061 LIPID PANEL: CPT | Performed by: INTERNAL MEDICINE

## 2020-10-12 PROCEDURE — 90682 RIV4 VACC RECOMBINANT DNA IM: CPT | Performed by: INTERNAL MEDICINE

## 2020-10-12 PROCEDURE — G0103 PSA SCREENING: HCPCS | Performed by: INTERNAL MEDICINE

## 2020-10-12 PROCEDURE — 84443 ASSAY THYROID STIM HORMONE: CPT | Performed by: INTERNAL MEDICINE

## 2020-10-12 PROCEDURE — 85027 COMPLETE CBC AUTOMATED: CPT | Performed by: INTERNAL MEDICINE

## 2020-10-12 PROCEDURE — 80053 COMPREHEN METABOLIC PANEL: CPT | Performed by: INTERNAL MEDICINE

## 2020-10-12 PROCEDURE — 90750 HZV VACC RECOMBINANT IM: CPT | Performed by: INTERNAL MEDICINE

## 2020-10-12 PROCEDURE — 90472 IMMUNIZATION ADMIN EACH ADD: CPT | Performed by: INTERNAL MEDICINE

## 2020-10-12 PROCEDURE — 90471 IMMUNIZATION ADMIN: CPT | Performed by: INTERNAL MEDICINE

## 2020-10-12 PROCEDURE — 99396 PREV VISIT EST AGE 40-64: CPT | Mod: 25 | Performed by: INTERNAL MEDICINE

## 2020-10-12 RX ORDER — OMEPRAZOLE 20 MG/1
20 TABLET, DELAYED RELEASE ORAL DAILY
COMMUNITY

## 2020-10-12 ASSESSMENT — MIFFLIN-ST. JEOR: SCORE: 1842.77

## 2020-10-12 NOTE — PROGRESS NOTES
SUBJECTIVE:   CC: Chaparro Hanson is an 55 year old male who presents for preventative health visit.       Patient has been advised of split billing requirements and indicates understanding: Yes  Healthy Habits:     Getting at least 3 servings of Calcium per day:  Yes    Bi-annual eye exam:  Yes    Dental care twice a year:  Yes    Sleep apnea or symptoms of sleep apnea:  None    Diet:  Gluten-free/reduced    Frequency of exercise:  2-3 days/week    Duration of exercise:  15-30 minutes    Taking medications regularly:  Yes    Medication side effects:  None, No muscle aches and No significant flushing    PHQ-2 Total Score: 2    Additional concerns today:  No          PROBLEMS TO ADD ON...    Has h/o migraines, seeing neurology at [facility] yearly. On PRN Imitrex. Has 8-9 episodes per month.   Has h/o GERD , hiatal hernia, on PPI treatment. symptoms are controlled. No nausea, vomiting, heartburns, bloating.      Today's PHQ-2 Score:   PHQ-2 ( 1999 Pfizer) 10/11/2020   Q1: Little interest or pleasure in doing things 1   Q2: Feeling down, depressed or hopeless 1   PHQ-2 Score 2   Q1: Little interest or pleasure in doing things Several days   Q2: Feeling down, depressed or hopeless Several days   PHQ-2 Score 2       Abuse: Current or Past(Physical, Sexual or Emotional)- No  Do you feel safe in your environment? Yes    Have you ever done Advance Care Planning? (For example, a Health Directive, POLST, or a discussion with a medical provider or your loved ones about your wishes): Yes, patient states has an Advance Care Planning document and will bring a copy to the clinic.    Social History     Tobacco Use     Smoking status: Never Smoker     Smokeless tobacco: Never Used   Substance Use Topics     Alcohol use: Yes     Comment: Occasionally     If you drink alcohol do you typically have >3 drinks per day or >7 drinks per week? No    Alcohol Use 10/11/2020   Prescreen: >3 drinks/day or >7 drinks/week? Not Applicable  "  Prescreen: >3 drinks/day or >7 drinks/week? -       Last PSA:   PSA   Date Value Ref Range Status   07/07/2015 0.96 0 - 4 ug/L Final       Reviewed orders with patient. Reviewed health maintenance and updated orders accordingly - Yes  Lab work is in process  Labs reviewed in EPIC    Reviewed and updated as needed this visit by clinical staff  Tobacco  Allergies  Meds   Med Hx  Surg Hx  Fam Hx  Soc Hx        Reviewed and updated as needed this visit by Provider                    Review of Systems  CONSTITUTIONAL: NEGATIVE for fever, chills, change in weight  INTEGUMENTARY/SKIN: NEGATIVE for worrisome rashes, moles or lesions  EYES: NEGATIVE for vision changes or irritation  ENT: NEGATIVE for ear, mouth and throat problems  RESP: NEGATIVE for significant cough or SOB  CV: NEGATIVE for chest pain, palpitations or peripheral edema  GI: NEGATIVE for nausea, abdominal pain, heartburn, or change in bowel habits   male: negative for dysuria, hematuria, decreased urinary stream, erectile dysfunction, urethral discharge  MUSCULOSKELETAL: NEGATIVE for significant arthralgias or myalgia  NEURO: NEGATIVE for weakness, dizziness or paresthesias  PSYCHIATRIC: NEGATIVE for changes in mood or affect    OBJECTIVE:   /77 (BP Location: Right arm, Patient Position: Sitting, Cuff Size: Adult Regular)   Pulse 65   Temp 98.1  F (36.7  C) (Oral)   Ht 1.88 m (6' 2\")   Wt 94.8 kg (209 lb)   SpO2 95%   BMI 26.83 kg/m      Physical Exam  GENERAL: healthy, alert and no distress  EYES: Eyes grossly normal to inspection, PERRL and conjunctivae and sclerae normal  HENT: ear canals and TM's normal, nose and mouth without ulcers or lesions  NECK: no adenopathy, no asymmetry, masses, or scars and thyroid normal to palpation  RESP: lungs clear to auscultation - no rales, rhonchi or wheezes  CV: regular rate and rhythm, normal S1 S2, no S3 or S4, no murmur, click or rub, no peripheral edema and peripheral pulses strong  ABDOMEN: " "soft, nontender, no hepatosplenomegaly, no masses and bowel sounds normal  MS: no gross musculoskeletal defects noted, no edema  SKIN: no suspicious lesions or rashes  NEURO: Normal strength and tone, mentation intact and speech normal  PSYCH: mentation appears normal, affect normal/bright    Diagnostic Test Results:  Labs reviewed in Epic    ASSESSMENT/PLAN:       ICD-10-CM    1. Encounter for preventative adult health care examination  Z00.00 CBC with platelets     Comprehensive metabolic panel     Lipid panel reflex to direct LDL Fasting     Prostate spec antigen screen     TSH with free T4 reflex   2. Screening for prostate cancer  Z12.5 Prostate spec antigen screen       Patient has been advised of split billing requirements and indicates understanding: Yes  COUNSELING:   Reviewed preventive health counseling, as reflected in patient instructions       Regular exercise       Healthy diet/nutrition       Vision screening       Hearing screening       Colon cancer screening       Prostate cancer screening    Estimated body mass index is 26.83 kg/m  as calculated from the following:    Height as of this encounter: 1.88 m (6' 2\").    Weight as of this encounter: 94.8 kg (209 lb).         He reports that he has never smoked. He has never used smokeless tobacco.      Counseling Resources:  ATP IV Guidelines  Pooled Cohorts Equation Calculator  FRAX Risk Assessment  ICSI Preventive Guidelines  Dietary Guidelines for Americans, 2010  USDA's MyPlate  ASA Prophylaxis  Lung CA Screening    Miky Messer MD  [facility]  "

## 2020-10-13 LAB
ALBUMIN SERPL-MCNC: 3.8 G/DL (ref 3.4–5)
ALP SERPL-CCNC: 44 U/L (ref 40–150)
ALT SERPL W P-5'-P-CCNC: 55 U/L (ref 0–70)
ANION GAP SERPL CALCULATED.3IONS-SCNC: 4 MMOL/L (ref 3–14)
AST SERPL W P-5'-P-CCNC: 27 U/L (ref 0–45)
BILIRUB SERPL-MCNC: 0.5 MG/DL (ref 0.2–1.3)
BUN SERPL-MCNC: 8 MG/DL (ref 7–30)
CALCIUM SERPL-MCNC: 8.9 MG/DL (ref 8.5–10.1)
CHLORIDE SERPL-SCNC: 106 MMOL/L (ref 94–109)
CHOLEST SERPL-MCNC: 143 MG/DL
CO2 SERPL-SCNC: 29 MMOL/L (ref 20–32)
CREAT SERPL-MCNC: 0.76 MG/DL (ref 0.66–1.25)
GFR SERPL CREATININE-BSD FRML MDRD: >90 ML/MIN/{1.73_M2}
GLUCOSE SERPL-MCNC: 119 MG/DL (ref 70–99)
HDLC SERPL-MCNC: 34 MG/DL
LDLC SERPL CALC-MCNC: 81 MG/DL
NONHDLC SERPL-MCNC: 109 MG/DL
POTASSIUM SERPL-SCNC: 4 MMOL/L (ref 3.4–5.3)
PROT SERPL-MCNC: 6.4 G/DL (ref 6.8–8.8)
PSA SERPL-ACNC: 1.32 UG/L (ref 0–4)
SODIUM SERPL-SCNC: 139 MMOL/L (ref 133–144)
TRIGL SERPL-MCNC: 141 MG/DL
TSH SERPL DL<=0.005 MIU/L-ACNC: 1.18 MU/L (ref 0.4–4)

## 2020-10-29 ENCOUNTER — HOSPITAL ENCOUNTER (OUTPATIENT)
Dept: ULTRASOUND IMAGING | Facility: CLINIC | Age: 55
Discharge: HOME | End: 2020-10-29
Attending: INTERNAL MEDICINE | Admitting: INTERNAL MEDICINE
Payer: COMMERCIAL

## 2020-10-29 DIAGNOSIS — K82.4 GALL BLADDER POLYP: ICD-10-CM

## 2020-10-29 PROCEDURE — 76705 ECHO EXAM OF ABDOMEN: CPT

## 2021-01-27 ENCOUNTER — E-VISIT (OUTPATIENT)
Dept: INTERNAL MEDICINE | Facility: CLINIC | Age: 56
End: 2021-01-27
Payer: COMMERCIAL

## 2021-01-27 DIAGNOSIS — J06.9 VIRAL URI: Primary | ICD-10-CM

## 2021-01-27 PROCEDURE — 99421 OL DIG E/M SVC 5-10 MIN: CPT | Performed by: NURSE PRACTITIONER

## 2021-03-16 ENCOUNTER — MYC MEDICAL ADVICE (OUTPATIENT)
Dept: INTERNAL MEDICINE | Facility: CLINIC | Age: 56
End: 2021-03-16

## 2021-03-16 DIAGNOSIS — K90.0 CELIAC DISEASE: Primary | ICD-10-CM

## 2021-03-22 ENCOUNTER — ALLIED HEALTH/NURSE VISIT (OUTPATIENT)
Dept: NURSING | Facility: CLINIC | Age: 56
End: 2021-03-22
Payer: COMMERCIAL

## 2021-03-22 DIAGNOSIS — Z23 NEED FOR SHINGLES VACCINE: Primary | ICD-10-CM

## 2021-03-22 PROCEDURE — 90750 HZV VACC RECOMBINANT IM: CPT

## 2021-03-22 PROCEDURE — 90471 IMMUNIZATION ADMIN: CPT

## 2021-04-13 ENCOUNTER — VIRTUAL VISIT (OUTPATIENT)
Dept: GASTROENTEROLOGY | Facility: CLINIC | Age: 56
End: 2021-04-13
Attending: INTERNAL MEDICINE
Payer: COMMERCIAL

## 2021-04-13 ENCOUNTER — PRE VISIT (OUTPATIENT)
Dept: GASTROENTEROLOGY | Facility: CLINIC | Age: 56
End: 2021-04-13

## 2021-04-13 VITALS — WEIGHT: 210 LBS | HEIGHT: 74 IN | BODY MASS INDEX: 26.95 KG/M2

## 2021-04-13 DIAGNOSIS — K90.0 CELIAC DISEASE: ICD-10-CM

## 2021-04-13 PROCEDURE — 99204 OFFICE O/P NEW MOD 45 MIN: CPT | Mod: GT | Performed by: PHYSICIAN ASSISTANT

## 2021-04-13 ASSESSMENT — MIFFLIN-ST. JEOR: SCORE: 1842.3

## 2021-04-13 NOTE — NURSING NOTE
"Chief Complaint   Patient presents with     New Patient       Vitals:    04/13/21 1253   Weight: 95.3 kg (210 lb)   Height: 1.88 m (6' 2\")       Body mass index is 26.96 kg/m .    Taryn Buitrago CMA    "

## 2021-04-13 NOTE — PROGRESS NOTES
GI CLINIC VISIT    CC/REFERRING PROVIDER: Miky Messer  REASON FOR CONSULTATION: celiac disease    HPI: 56 year old male with PMH of migraine headaches, celiac disease with dermatitis herpetiformis, presenting to GI clinic for follow-up of celiac disease.    EGD [id number] for hiatal hernia and dysphagia, requiring balloon dilation.     Later presented with dermatitis herpetiformis in 2009. He was initially on Dapsone. Endoscopy in 2011 at [facility] to confirm celiac disease, we do not have these records.     He has had occasional flare ups of DH despite GFD, used dapsone intermittently, last a few years ago.    Today, notes 4-5 month history of bloating pretty consistently with frequent defecation, with small, subjectively incomplete bowel movements 4-5 daily. Consistency is at baseline, formed, without loose stool. Rare dry stools. Some cramping preceding BM with resolution after passing stool. Bloating improves very slightly after stool. No BRBPR, melena, steatorrhea,  Eats a lot of vegetables, salads. Awakes feeling bloated, worsens through the day. No postprandial worsening of bloating. He does note appetite has been slightly diminished, which is relatively new. No weight loss.    Lots of sitting with working from home, admits he is not as active as he was previously. Father and HELADIO both passed away recently around time of symptom onset.  Disrupted sleep.    History of hiatal hernia, dysphagia. Last episode was several years ago. Never lasted more than 30 minutes. Has heartburn and reflux, on OTC Prilosec 20 mg daily.     No family history of any UC, crohns. Father had 2000 for some sort of twisted stomach vs intestines.    Rare NSAID use.    ROS: 10pt ROS performed and otherwise negative.    PAST MEDICAL HISTORY:  Past Medical History:   Diagnosis Date     Celiac disease     in my 40's ([id number])       PREVIOUS ABDOMINAL/GYNECOLOGIC SURGERIES:  Past Surgical History:   Procedure Laterality Date      COLONOSCOPY N/A 11/6/2014    Procedure: COLONOSCOPY;  Surgeon: Aldo Irving MD;  Location:  GI     ENDOSCOPY  2011     TONSILLECTOMY  age 7         PERTINENT MEDICATIONS:  Current Outpatient Medications   Medication Sig Dispense Refill     biotin 1000 MCG TABS tablet Take 1,000 mcg by mouth daily       Cholecalciferol (VITAMIN D3 PO) Take by mouth daily       fish oil-omega-3 fatty acids 1000 MG capsule Take 1 g by mouth daily       omeprazole (PRILOSEC OTC) 20 MG EC tablet Take 20 mg by mouth daily       SUMAtriptan (IMITREX) 100 MG tablet Take by mouth at onset of headache for migraine       No other NSAIDs reported by patient.  No other OTC/herbal/supplements reported by patient.    SOCIAL HISTORY:    Social History     Socioeconomic History     Marital status:      Spouse name: Cinthya     Number of children: 0     Years of education: Not on file     Highest education level: Not on file   Occupational History     Not on file   Social Needs     Financial resource strain: Not on file     Food insecurity     Worry: Not on file     Inability: Not on file     Transportation needs     Medical: Not on file     Non-medical: Not on file   Tobacco Use     Smoking status: Never Smoker     Smokeless tobacco: Never Used   Substance and Sexual Activity     Alcohol use: Yes     Comment: Occasionally     Drug use: No     Sexual activity: Yes     Partners: Female   Lifestyle     Physical activity     Days per week: Not on file     Minutes per session: Not on file     Stress: Not on file   Relationships     Social connections     Talks on phone: Not on file     Gets together: Not on file     Attends [employer] service: Not on file     Active member of club or organization: Not on file     Attends meetings of clubs or organizations: Not on file     Relationship status: Not on file     Intimate partner violence     Fear of current or ex partner: Not on file     Emotionally abused: Not on file     Physically abused: Not on  file     Forced sexual activity: Not on file   Other Topics Concern     Parent/sibling w/ CABG, MI or angioplasty before 65F 55M? Not Asked   Social History Narrative     Not on file       FAMILY HISTORY:  Family History   Problem Relation Age of Onset     Cancer Father 78        Prostate      Cancer - colorectal No family hx of        PHYSICAL EXAMINATION:  Vitals reviewed  There were no vitals taken for this visit.  Video physical exam  General: Patient appears well in no acute distress.   Skin: No visualized rash or lesions on visualized skin  Eyes: EOMI, no erythema, sclera icterus or discharge noted  Resp: Appears to be breathing comfortably without accessory muscle usage, speaking in full sentences, no cough  MSK: Appears to have normal range of motion based on visualized movements  Neurologic: No apparent tremors, facial movements symmetric  Psych: affect normal, alert and oriented    The rest of a comprehensive physical examination is deferred due to PHE (public health emergency) video restrictions      PERTINENT STUDIES Reviewed in EMR    ASSESSMENT/PLAN:  56 year old male with PMH of migraine headaches, celiac disease with dermatitis herpetiformis, hiatal hernia with possible peptic stricture, presenting to GI clinic for follow-up of celiac disease.    # Celiac disease  Initially presented with DH without report of any GI symptoms. Adherent to GFD. Will check baseline celiac serologies and b12, folate, vitamin D, iron studies.    # Bloating  # Change in stool frequency  Several month history of bloating with change in stool pattern to include 4-5 small, incomplete stools daily, without change in consistency (baseline BSC 3-4) or caliber. No red flag symptoms. Colonoscopy in 2014 was unremarkable with recommendation to repeat 2024. Concomitant life stressors with symptom onset. Symptom improvement with passing stool.    Will confirm celiac disease is not contributing as above. Differential also includes  functional processes including irritable bowel syndrome and/or functional bloating, constipation with bloating. Consistency not concerning for a micropsic colitis or inflammatory process. No symptoms suggestive of functional dyspepsia or delayed gastric emptying. Will trial soluble fiber supplementation and if no improvement or worsening smyptoms, consider endoscopic evaluation.    # History of hiatal hernia  # History of dysphagia, s/p dilation  Unclear records but by patient history, sounds like there may have been a peptic stricture. Now on omeprazole 20 mg without recurrent dysphagia. Continue. If recurrent symptoms, repeat EGD with possible dilation.      RTC 2-3 months    Thank you for this consultation. It was a pleasure to participate in the care of this patient; please contact us with any further questions.    Yara Baker PA-C    52 minutes spent on the date of the encounter doing chart review, review of test results, patient visit and documentation

## 2021-04-13 NOTE — PATIENT INSTRUCTIONS
It was a pleasure taking care of you today.  I've included a brief summary of our discussion and care plan from today's visit below.  Please review this information with your primary care provider.  _______________________________________________________________________    My recommendations are summarized as follows:    -- Lab orders have been placed,which can be performed at any [facility] lab at your convenience. To schedule lab appointment here, use my chart or call [phone].   -- Recommend starting supplementation with soluble fiber. When used on a daily basis, this can help regulate the consistency of your stools. Metamucil and Citrucel are preferred examples (both are Gluten Free). You can start with 1-2 teaspoon per day, with goal to gradually increase to 1 tablespoon daily. It is important to stay well-hydrated with use of fiber supplementation and to make sure that the fiber powder is well mixed with water as directed on the label. You may experience some bloating with initiation of fiber, which will improve over the first few weeks. A good trial to evaluate the effect is 3-6 months.    Return to GI Clinic in 2-3  months to review your progress.    ______________________________________________________________________    How do I schedule labs, imaging studies, or procedures that were ordered in clinic today?     Labs: To schedule lab appointment at the [facility], use my chart or call [phone]. If you have a [facility] lab closer to home where you are regularly seen you can give them a call.     Procedures: If a colonoscopy, upper endoscopy, breath test, esophageal manometry, or pH impedence was ordered today, our endoscopy team will call you to schedule this. If you have not heard from our endoscopy team within a week, please call [phone] to schedule.     Imaging Studies: If you were scheduled for a CT scan, X-ray, MRI, ultrasound, HIDA scan or other imaging study, please call  [phone] to have this scheduled.     Referral: If a referral to another specialty was ordered, expect a phone call or follow instructions above. If you have not heard from anyone regarding your referral in a week, please call our clinic to check the status.     Who do I call with any questions after my visit?  Please be in touch if there are any further questions that arise following today's visit.  There are multiple ways to contact your gastroenterology care team.        During business hours, you may reach a Gastroenterology nurse at [phone]      To schedule or reschedule an appointment, please call [phone].       You can always send a secure message through [employer].  [employer] messages are answered by your nurse or doctor typically within 24 hours.  Please allow extra time on weekends and holidays.        For urgent/emergent questions after business hours, you may reach the on-call GI Fellow by contacting the [facility]  at [phone].     How will I get the results of any tests ordered?    You will receive all of your results.  If you have signed up for [employer], any tests ordered at your visit will be available to you after your physician reviews them.  Typically this takes 1-2 weeks.  If there are urgent results that require a change in your care plan, your physician or nurse will call you to discuss the next steps.      What is [employer]?  [employer] is a secure way for you to access all of your healthcare records from the [facility].  It is a web based computer program, so you can sign on to it from any location.  It also allows you to send secure messages to your care team.  I recommend signing up for [employer] access if you have not already done so and are comfortable with using a computer.      How to I schedule a follow-up visit?  If you did not schedule a follow-up visit today, please call [phone] to schedule a follow-up office visit.      Sincerely,    Yara  SUSANNE Baker  Division of Gastroenterology, Hepatology & Nutrition  [facility]

## 2021-04-13 NOTE — LETTER
4/13/2021         RE: Chaparro Hanson  [address]  [facility] [address]        Dear Colleague,    Thank you for referring your patient, Chaparro Hanson, to the [facility] [place]. Please see a copy of my visit note below.    GI CLINIC VISIT    CC/REFERRING PROVIDER: Miky Messer  REASON FOR CONSULTATION: celiac disease    HPI: 56 year old male with PMH of migraine headaches, celiac disease with dermatitis herpetiformis, presenting to GI clinic for follow-up of celiac disease.    EGD [id number] for hiatal hernia and dysphagia, requiring balloon dilation.     Later presented with dermatitis herpetiformis in 2009. He was initially on Dapsone. Endoscopy in 2011 at [facility] to confirm celiac disease, we do not have these records.     He has had occasional flare ups of DH despite GFD, used dapsone intermittently, last a few years ago.    Today, notes 4-5 month history of bloating pretty consistently with frequent defecation, with small, subjectively incomplete bowel movements 4-5 daily. Consistency is at baseline, formed, without loose stool. Rare dry stools. Some cramping preceding BM with resolution after passing stool. Bloating improves very slightly after stool. No BRBPR, melena, steatorrhea,  Eats a lot of vegetables, salads. Awakes feeling bloated, worsens through the day. No postprandial worsening of bloating. He does note appetite has been slightly diminished, which is relatively new. No weight loss.    Lots of sitting with working from home, admits he is not as active as he was previously. Father and HELADIO both passed away recently around time of symptom onset.  Disrupted sleep.    History of hiatal hernia, dysphagia. Last episode was several years ago. Never lasted more than 30 minutes. Has heartburn and reflux, on OTC Prilosec 20 mg daily.     No family history of any UC, crohns. Father had 2000 for some sort of twisted stomach vs intestines.    Rare NSAID  use.    ROS: 10pt ROS performed and otherwise negative.    PAST MEDICAL HISTORY:  Past Medical History:   Diagnosis Date     Celiac disease     in my 40's ([id number])       PREVIOUS ABDOMINAL/GYNECOLOGIC SURGERIES:  Past Surgical History:   Procedure Laterality Date     COLONOSCOPY N/A 11/6/2014    Procedure: COLONOSCOPY;  Surgeon: Aldo Irving MD;  Location:  GI     ENDOSCOPY  2011     TONSILLECTOMY  age 7         PERTINENT MEDICATIONS:  Current Outpatient Medications   Medication Sig Dispense Refill     biotin 1000 MCG TABS tablet Take 1,000 mcg by mouth daily       Cholecalciferol (VITAMIN D3 PO) Take by mouth daily       fish oil-omega-3 fatty acids 1000 MG capsule Take 1 g by mouth daily       omeprazole (PRILOSEC OTC) 20 MG EC tablet Take 20 mg by mouth daily       SUMAtriptan (IMITREX) 100 MG tablet Take by mouth at onset of headache for migraine       No other NSAIDs reported by patient.  No other OTC/herbal/supplements reported by patient.    SOCIAL HISTORY:    Social History     Socioeconomic History     Marital status:      Spouse name: Cinthya     Number of children: 0     Years of education: Not on file     Highest education level: Not on file   Occupational History     Not on file   Social Needs     Financial resource strain: Not on file     Food insecurity     Worry: Not on file     Inability: Not on file     Transportation needs     Medical: Not on file     Non-medical: Not on file   Tobacco Use     Smoking status: Never Smoker     Smokeless tobacco: Never Used   Substance and Sexual Activity     Alcohol use: Yes     Comment: Occasionally     Drug use: No     Sexual activity: Yes     Partners: Female   Lifestyle     Physical activity     Days per week: Not on file     Minutes per session: Not on file     Stress: Not on file   Relationships     Social connections     Talks on phone: Not on file     Gets together: Not on file     Attends Pentecostal service: Not on file     Active member of  club or organization: Not on file     Attends meetings of clubs or organizations: Not on file     Relationship status: Not on file     Intimate partner violence     Fear of current or ex partner: Not on file     Emotionally abused: Not on file     Physically abused: Not on file     Forced sexual activity: Not on file   Other Topics Concern     Parent/sibling w/ CABG, MI or angioplasty before 65F 55M? Not Asked   Social History Narrative     Not on file       FAMILY HISTORY:  Family History   Problem Relation Age of Onset     Cancer Father 78        Prostate      Cancer - colorectal No family hx of        PHYSICAL EXAMINATION:  Vitals reviewed  There were no vitals taken for this visit.  Video physical exam  General: Patient appears well in no acute distress.   Skin: No visualized rash or lesions on visualized skin  Eyes: EOMI, no erythema, sclera icterus or discharge noted  Resp: Appears to be breathing comfortably without accessory muscle usage, speaking in full sentences, no cough  MSK: Appears to have normal range of motion based on visualized movements  Neurologic: No apparent tremors, facial movements symmetric  Psych: affect normal, alert and oriented    The rest of a comprehensive physical examination is deferred due to PHE (public health emergency) video restrictions      PERTINENT STUDIES Reviewed in EMR    ASSESSMENT/PLAN:  56 year old male with PMH of migraine headaches, celiac disease with dermatitis herpetiformis, hiatal hernia with possible peptic stricture, presenting to GI clinic for follow-up of celiac disease.    # Celiac disease  Initially presented with DH without report of any GI symptoms. Adherent to GFD. Will check baseline celiac serologies and b12, folate, vitamin D, iron studies.    # Bloating  # Change in stool frequency  Several month history of bloating with change in stool pattern to include 4-5 small, incomplete stools daily, without change in consistency (baseline BSC 3-4) or caliber.  "No red flag symptoms. Colonoscopy in 2014 was unremarkable with recommendation to repeat 2024. Concomitant life stressors with symptom onset. Symptom improvement with passing stool.    Will confirm celiac disease is not contributing as above. Differential also includes functional processes including irritable bowel syndrome and/or functional bloating, constipation with bloating. Consistency not concerning for a micropsic colitis or inflammatory process. No symptoms suggestive of functional dyspepsia or delayed gastric emptying. Will trial soluble fiber supplementation and if no improvement or worsening smyptoms, consider endoscopic evaluation.    # History of hiatal hernia  # History of dysphagia, s/p dilation  Unclear records but by patient history, sounds like there may have been a peptic stricture. Now on omeprazole 20 mg without recurrent dysphagia. Continue. If recurrent symptoms, repeat EGD with possible dilation.      RTC 2-3 months    Thank you for this consultation. It was a pleasure to participate in the care of this patient; please contact us with any further questions.    Yara Baker PA-C    52 minutes spent on the date of the encounter doing chart review, review of test results, patient visit and documentation        Chaparro Hanson is a 56 year old male who is being evaluated via a billable video visit.      The patient has been notified of following:     \"This video visit will be conducted via a call between you and your physician/provider. We have found that certain health care needs can be provided without the need for an in-person physical exam.  This service lets us provide the care you need with a video conversation.  If a prescription is necessary we can send it directly to your pharmacy.  If lab work is needed we can place an order for that and you can then stop by our lab to have the test done at a later time.    If during the course of the call the physician/provider feels a video visit is " "not appropriate, you will not be charged for this service.\"     Patient confirmed that they are in [place] for today's visit yes.    Video-Visit Details  Type of service:  Video Visit    Video Start Time: 101  Video End Time:  142    Originating Location (pt. Location): Home    Distant Location (provider location):  [facility] [place]     Platform used: [employer]      Again, thank you for allowing me to participate in the care of your patient.        Sincerely,        Yara Baker PA-C    "

## 2021-04-13 NOTE — PROGRESS NOTES
"Chaparro Hanson is a 56 year old male who is being evaluated via a billable video visit.      The patient has been notified of following:     \"This video visit will be conducted via a call between you and your physician/provider. We have found that certain health care needs can be provided without the need for an in-person physical exam.  This service lets us provide the care you need with a video conversation.  If a prescription is necessary we can send it directly to your pharmacy.  If lab work is needed we can place an order for that and you can then stop by our lab to have the test done at a later time.    If during the course of the call the physician/provider feels a video visit is not appropriate, you will not be charged for this service.\"     Patient confirmed that they are in [place] for today's visit yes.    Video-Visit Details  Type of service:  Video Visit    Video Start Time: 101  Video End Time:  142    Originating Location (pt. Location): Home    Distant Location (provider location):  [facility] [place]     Platform used: Denisha            "

## 2021-04-27 DIAGNOSIS — K90.0 CELIAC DISEASE: ICD-10-CM

## 2021-04-27 LAB
BASOPHILS # BLD AUTO: 0 10E9/L (ref 0–0.2)
BASOPHILS NFR BLD AUTO: 0.3 %
DIFFERENTIAL METHOD BLD: NORMAL
EOSINOPHIL # BLD AUTO: 0.4 10E9/L (ref 0–0.7)
EOSINOPHIL NFR BLD AUTO: 5.7 %
ERYTHROCYTE [DISTWIDTH] IN BLOOD BY AUTOMATED COUNT: 13.4 % (ref 10–15)
HCT VFR BLD AUTO: 45.9 % (ref 40–53)
HGB BLD-MCNC: 16.1 G/DL (ref 13.3–17.7)
LYMPHOCYTES # BLD AUTO: 2.4 10E9/L (ref 0.8–5.3)
LYMPHOCYTES NFR BLD AUTO: 30.9 %
MCH RBC QN AUTO: 32.7 PG (ref 26.5–33)
MCHC RBC AUTO-ENTMCNC: 35.1 G/DL (ref 31.5–36.5)
MCV RBC AUTO: 93 FL (ref 78–100)
MONOCYTES # BLD AUTO: 0.7 10E9/L (ref 0–1.3)
MONOCYTES NFR BLD AUTO: 9.1 %
NEUTROPHILS # BLD AUTO: 4.2 10E9/L (ref 1.6–8.3)
NEUTROPHILS NFR BLD AUTO: 54 %
PLATELET # BLD AUTO: 206 10E9/L (ref 150–450)
RBC # BLD AUTO: 4.93 10E12/L (ref 4.4–5.9)
WBC # BLD AUTO: 7.7 10E9/L (ref 4–11)

## 2021-04-27 PROCEDURE — 85025 COMPLETE CBC W/AUTO DIFF WBC: CPT | Performed by: PHYSICIAN ASSISTANT

## 2021-04-27 PROCEDURE — 80053 COMPREHEN METABOLIC PANEL: CPT | Performed by: PHYSICIAN ASSISTANT

## 2021-04-27 PROCEDURE — 83516 IMMUNOASSAY NONANTIBODY: CPT | Mod: 59 | Performed by: PHYSICIAN ASSISTANT

## 2021-04-27 PROCEDURE — 83550 IRON BINDING TEST: CPT | Performed by: PHYSICIAN ASSISTANT

## 2021-04-27 PROCEDURE — 82306 VITAMIN D 25 HYDROXY: CPT | Performed by: PHYSICIAN ASSISTANT

## 2021-04-27 PROCEDURE — 83540 ASSAY OF IRON: CPT | Performed by: PHYSICIAN ASSISTANT

## 2021-04-27 PROCEDURE — 82746 ASSAY OF FOLIC ACID SERUM: CPT | Performed by: PHYSICIAN ASSISTANT

## 2021-04-27 PROCEDURE — 82728 ASSAY OF FERRITIN: CPT | Performed by: PHYSICIAN ASSISTANT

## 2021-04-27 PROCEDURE — 36415 COLL VENOUS BLD VENIPUNCTURE: CPT | Performed by: PHYSICIAN ASSISTANT

## 2021-04-27 PROCEDURE — 83516 IMMUNOASSAY NONANTIBODY: CPT | Performed by: PHYSICIAN ASSISTANT

## 2021-04-28 LAB
ALBUMIN SERPL-MCNC: 4 G/DL (ref 3.4–5)
ALP SERPL-CCNC: 45 U/L (ref 40–150)
ALT SERPL W P-5'-P-CCNC: 49 U/L (ref 0–70)
ANION GAP SERPL CALCULATED.3IONS-SCNC: 2 MMOL/L (ref 3–14)
AST SERPL W P-5'-P-CCNC: 20 U/L (ref 0–45)
BILIRUB SERPL-MCNC: 0.4 MG/DL (ref 0.2–1.3)
BUN SERPL-MCNC: 9 MG/DL (ref 7–30)
CALCIUM SERPL-MCNC: 9 MG/DL (ref 8.5–10.1)
CHLORIDE SERPL-SCNC: 106 MMOL/L (ref 94–109)
CO2 SERPL-SCNC: 32 MMOL/L (ref 20–32)
CREAT SERPL-MCNC: 0.78 MG/DL (ref 0.66–1.25)
DEPRECATED CALCIDIOL+CALCIFEROL SERPL-MC: 102 UG/L (ref 20–75)
FERRITIN SERPL-MCNC: 88 NG/ML (ref 26–388)
FOLATE SERPL-MCNC: 4.4 NG/ML
GFR SERPL CREATININE-BSD FRML MDRD: >90 ML/MIN/{1.73_M2}
GLUCOSE SERPL-MCNC: 80 MG/DL (ref 70–99)
IRON SATN MFR SERPL: 29 % (ref 15–46)
IRON SERPL-MCNC: 91 UG/DL (ref 35–180)
POTASSIUM SERPL-SCNC: 4.1 MMOL/L (ref 3.4–5.3)
PROT SERPL-MCNC: 6.5 G/DL (ref 6.8–8.8)
SODIUM SERPL-SCNC: 140 MMOL/L (ref 133–144)
TIBC SERPL-MCNC: 312 UG/DL (ref 240–430)

## 2021-04-29 LAB
GLIADIN IGA SER-ACNC: 1 U/ML
GLIADIN IGG SER-ACNC: <1 U/ML
TTG IGA SER-ACNC: 1 U/ML
TTG IGG SER-ACNC: 1 U/ML

## 2021-10-03 ENCOUNTER — HEALTH MAINTENANCE LETTER (OUTPATIENT)
Age: 56
End: 2021-10-03

## 2021-11-28 ENCOUNTER — HEALTH MAINTENANCE LETTER (OUTPATIENT)
Age: 56
End: 2021-11-28

## 2021-12-21 ENCOUNTER — OFFICE VISIT (OUTPATIENT)
Dept: DERMATOLOGY | Facility: CLINIC | Age: 56
End: 2021-12-21
Payer: COMMERCIAL

## 2021-12-21 VITALS — OXYGEN SATURATION: 97 % | SYSTOLIC BLOOD PRESSURE: 132 MMHG | HEART RATE: 70 BPM | DIASTOLIC BLOOD PRESSURE: 70 MMHG

## 2021-12-21 DIAGNOSIS — L82.1 SEBORRHEIC KERATOSIS: ICD-10-CM

## 2021-12-21 DIAGNOSIS — L40.9 PSORIASIS: Primary | ICD-10-CM

## 2021-12-21 DIAGNOSIS — D22.9 NEVUS: ICD-10-CM

## 2021-12-21 DIAGNOSIS — D18.01 ANGIOMA OF SKIN: ICD-10-CM

## 2021-12-21 DIAGNOSIS — L81.4 LENTIGO: ICD-10-CM

## 2021-12-21 PROCEDURE — 99214 OFFICE O/P EST MOD 30 MIN: CPT | Performed by: PHYSICIAN ASSISTANT

## 2021-12-21 RX ORDER — BETAMETHASONE DIPROPIONATE 0.5 MG/G
CREAM TOPICAL
Qty: 50 G | Refills: 2 | Status: SHIPPED | OUTPATIENT
Start: 2021-12-21

## 2021-12-21 NOTE — LETTER
12/21/2021         RE: Chaparro Hanson  [address]  [facility] [address]        Dear Colleague,    Thank you for referring your patient, Chaparro Hanson, to the [facility]. Please see a copy of my visit note below.    HPI:   Chief complaints: Chaparro Hanson is a 56 year old male who presents for Full skin cancer screening to rule out skin cancer   Last Skin Exam: 1 year ago      1st Baseline: no  Personal HX of Skin Cancer: no   Personal HX of Malignant Melanoma: no   Family HX of Skin Cancer / Malignant Melanoma: no  Personal HX of Atypical Moles:   no  Risk factors: history of sunburns and sun exposure  New / Changing lesions:  history of psoriasis and dermatitis herpetiforms. Psoriasis seems to be flaring.   Social History: wife is Cinthya Hanson who I see. 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 [employer]   On review of systems, there are no further skin complaints, patient is feeling otherwise well.  See patient intake sheet.  ROS of the following were done and are negative: Constitutional, Eyes, Ears, Nose,   Mouth, Throat, Cardiovascular, Respiratory, GI, Genitourinary, Musculoskeletal,   Psychiatric, Endocrine, Allergic/Immunologic.    PHYSICAL EXAM:   /70   Pulse 70   SpO2 97%   Skin exam performed as follows: Type 2 skin. Mood appropriate  Alert and Oriented X 3. Well developed, well nourished in no distress.  General appearance: Normal  Head including face: Normal  Eyes: conjunctiva and lids: Normal  Mouth: Lips, teeth, gums: Normal  Neck: Normal  Chest-breast/axillae: Normal  Back: Normal  Spleen and liver: Normal  Cardiovascular: Exam of peripheral vascular system by observation for swelling, varicosities, edema: Normal  Genitalia: groin, buttocks: Normal  Extremities: digits/nails (clubbing): Normal  Eccrine and Apocrine glands: Normal  Right upper extremity: Normal  Left upper extremity: Normal  Right lower  extremity: Normal  Left lower extremity: Normal  Skin: Scalp and body hair: See below    Pt deferred exam of breasts, groin, buttocks: No    Other physical findings:  1. Multiple pigmented macules on extremities and trunk  2. Multiple pigmented macules on face, trunk and extremities  3. Multiple vascular papules on trunk, arms and legs  4. Multiple scattered keratotic plaques       Except as noted above, no other signs of skin cancer or melanoma.     ASSESSMENT/PLAN:   Benign Full skin cancer screening today. . Patient with history of none  Advised on monthly self exams and 1 year  Patient Education: Appropriate brochures given.    1. Multiple benign appearing nevi on arms, legs and trunk. Discussed ABCDEs of melanoma and sunscreen.   2. Multiple lentigos on arms, legs and trunk. Advised benign, no treatment needed.  3. Multiple scattered angiomas. Advised benign, no treatment needed.   4. Seborrheic keratosis on arms, legs and trunk. Advised benign, no treatment needed.  5. Plaque psoriasis on the hands and left ear  --Start betamethasone ointment BID x 2-3 weeks then PRN only.                Follow-up: yearly FSE/PRN sooner    1.) Patient was asked about new and changing moles. YES  2.) Patient received a complete physical skin examination: YES  3.) Patient was counseled to perform a monthly self skin examination: YES  Scribed By: Yesenia Aranda, MS, PAGENO          Again, thank you for allowing me to participate in the care of your patient.        Sincerely,        Yesenia Aranda PA-C

## 2021-12-21 NOTE — PROGRESS NOTES
HPI:   Chief complaints: Chaparro Hanson is a 56 year old male who presents for Full skin cancer screening to rule out skin cancer   Last Skin Exam: 1 year ago      1st Baseline: no  Personal HX of Skin Cancer: no   Personal HX of Malignant Melanoma: no   Family HX of Skin Cancer / Malignant Melanoma: no  Personal HX of Atypical Moles:   no  Risk factors: history of sunburns and sun exposure  New / Changing lesions:  history of psoriasis and dermatitis herpetiforms. Psoriasis seems to be flaring.   Social History: wife is Cinthya Hanson who I see. 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  See patient intake sheet.  ROS of the following were done and are negative: Constitutional, Eyes, Ears, Nose,   Mouth, Throat, Cardiovascular, Respiratory, GI, Genitourinary, Musculoskeletal,   Psychiatric, Endocrine, Allergic/Immunologic.    PHYSICAL EXAM:   /70   Pulse 70   SpO2 97%   Skin exam performed as follows: Type 2 skin. Mood appropriate  Alert and Oriented X 3. Well developed, well nourished in no distress.  General appearance: Normal  Head including face: Normal  Eyes: conjunctiva and lids: Normal  Mouth: Lips, teeth, gums: Normal  Neck: Normal  Chest-breast/axillae: Normal  Back: Normal  Spleen and liver: Normal  Cardiovascular: Exam of peripheral vascular system by observation for swelling, varicosities, edema: Normal  Genitalia: groin, buttocks: Normal  Extremities: digits/nails (clubbing): Normal  Eccrine and Apocrine glands: Normal  Right upper extremity: Normal  Left upper extremity: Normal  Right lower extremity: Normal  Left lower extremity: Normal  Skin: Scalp and body hair: See below    Pt deferred exam of breasts, groin, buttocks: No    Other physical findings:  1. Multiple pigmented macules on extremities and trunk  2. Multiple pigmented macules on face,  trunk and extremities  3. Multiple vascular papules on trunk, arms and legs  4. Multiple scattered keratotic plaques       Except as noted above, no other signs of skin cancer or melanoma.     ASSESSMENT/PLAN:   Benign Full skin cancer screening today. . Patient with history of none  Advised on monthly self exams and 1 year  Patient Education: Appropriate brochures given.    1. Multiple benign appearing nevi on arms, legs and trunk. Discussed ABCDEs of melanoma and sunscreen.   2. Multiple lentigos on arms, legs and trunk. Advised benign, no treatment needed.  3. Multiple scattered angiomas. Advised benign, no treatment needed.   4. Seborrheic keratosis on arms, legs and trunk. Advised benign, no treatment needed.  5. Plaque psoriasis on the hands and left ear  --Start betamethasone ointment BID x 2-3 weeks then PRN only.                Follow-up: yearly FSE/PRN sooner    1.) Patient was asked about new and changing moles. YES  2.) Patient received a complete physical skin examination: YES  3.) Patient was counseled to perform a monthly self skin examination: YES  Scribed By: Yesenia Aranda, MS, PA-C

## 2022-02-24 ENCOUNTER — E-VISIT (OUTPATIENT)
Dept: INTERNAL MEDICINE | Facility: CLINIC | Age: 57
End: 2022-02-24
Payer: COMMERCIAL

## 2022-02-24 DIAGNOSIS — R05.1 ACUTE COUGH: Primary | ICD-10-CM

## 2022-02-24 PROCEDURE — 99207 PR NON-BILLABLE SERV PER CHARTING: CPT | Performed by: INTERNAL MEDICINE

## 2022-02-25 RX ORDER — GUAIFENESIN 1200 MG/1
1200 TABLET, EXTENDED RELEASE ORAL 2 TIMES DAILY
Qty: 60 TABLET | Refills: 0 | Status: SHIPPED | OUTPATIENT
Start: 2022-02-25

## 2022-02-25 RX ORDER — CETIRIZINE HYDROCHLORIDE 10 MG/1
10 TABLET ORAL DAILY
Qty: 90 TABLET | Refills: 3 | Status: SHIPPED | OUTPATIENT
Start: 2022-02-25

## 2022-09-10 ENCOUNTER — HEALTH MAINTENANCE LETTER (OUTPATIENT)
Age: 57
End: 2022-09-10

## 2022-12-06 ENCOUNTER — OFFICE VISIT (OUTPATIENT)
Dept: DERMATOLOGY | Facility: CLINIC | Age: 57
End: 2022-12-06
Payer: COMMERCIAL

## 2022-12-06 DIAGNOSIS — L40.9 PSORIASIS: Primary | ICD-10-CM

## 2022-12-06 DIAGNOSIS — L82.1 SEBORRHEIC KERATOSIS: ICD-10-CM

## 2022-12-06 PROCEDURE — 99214 OFFICE O/P EST MOD 30 MIN: CPT | Performed by: PHYSICIAN ASSISTANT

## 2022-12-06 RX ORDER — TAPINAROF 10 MG/1000MG
1 CREAM TOPICAL DAILY
Qty: 60 G | Refills: 5 | Status: SHIPPED | OUTPATIENT
Start: 2022-12-06 | End: 2022-12-13

## 2022-12-06 NOTE — LETTER
12/6/2022         RE: Chaparro Hanson  [address]  [facility] [address]        Dear Colleague,    Thank you for referring your patient, Chaparro Hanson, to the [facility]. Please see a copy of my visit note below.    HPI:   Chief complaints: Chaparro Hanson is a pleasant 57 year old male who presents for recheck of hand psoriasis. He has been using betamethasone cream but this is not helping. Additionally he has a new spot on the back of the right shoulder.       PHYSICAL EXAM:    There were no vitals taken for this visit.  Skin exam performed as follows: Type 2 skin. Mood appropriate  Alert and Oriented X 3. Well developed, well nourished in no distress.  General appearance: Normal  Head including face: Normal  Eyes: conjunctiva and lids: Normal  Mouth: Lips, teeth, gums: Normal  Neck: Normal  Cardiovascular: Exam of peripheral vascular system by observation for swelling, varicosities, edema: Normal  Right upper extremity: Normal  Left upper extremity: Normal  Right lower extremity: Normal  Left lower extremity: Normal  Skin: Scalp and body hair: See below    Psoriasiform dermatitis on bilateral dorsal hands  Keratotic papule on the right posterior shoulder    ASSESSMENT/PLAN:     1. Psoriasis - discussed ILK vs systemic medications vs Vtama. He would like to try Vtama.   --Start Vtama BID PRN. Discussed risk of folliculitis and contact dermatitis.     2. Seborrheic keratosis - advised benign no treatment needed.           Follow-up: PRN  CC:   Scribed By: Yesenia Aranda MS, SUSANNE          Again, thank you for allowing me to participate in the care of your patient.        Sincerely,        Yesenia Aranda PA-C

## 2022-12-06 NOTE — PROGRESS NOTES
HPI:   Chief complaints: Chaparro Hanson is a pleasant 57 year old male who presents for recheck of hand psoriasis. He has been using betamethasone cream but this is not helping. Additionally he has a new spot on the back of the right shoulder.       PHYSICAL EXAM:    There were no vitals taken for this visit.  Skin exam performed as follows: Type 2 skin. Mood appropriate  Alert and Oriented X 3. Well developed, well nourished in no distress.  General appearance: Normal  Head including face: Normal  Eyes: conjunctiva and lids: Normal  Mouth: Lips, teeth, gums: Normal  Neck: Normal  Cardiovascular: Exam of peripheral vascular system by observation for swelling, varicosities, edema: Normal  Right upper extremity: Normal  Left upper extremity: Normal  Right lower extremity: Normal  Left lower extremity: Normal  Skin: Scalp and body hair: See below    Psoriasiform dermatitis on bilateral dorsal hands  Keratotic papule on the right posterior shoulder    ASSESSMENT/PLAN:     1. Psoriasis - discussed ILK vs systemic medications vs Vtama. He would like to try Vtama.   --Start Vtama BID PRN. Discussed risk of folliculitis and contact dermatitis.     2. Seborrheic keratosis - advised benign no treatment needed.           Follow-up: PRN  CC:   Scribed By: Yesenia Aranda, MS, PA-C

## 2022-12-09 ENCOUNTER — TELEPHONE (OUTPATIENT)
Dept: DERMATOLOGY | Facility: CLINIC | Age: 57
End: 2022-12-09

## 2022-12-09 DIAGNOSIS — L40.9 PSORIASIS: ICD-10-CM

## 2022-12-09 NOTE — TELEPHONE ENCOUNTER
[facility]    Fax:[phone]    Spec: [phone]    MO: [phone]

## 2022-12-12 NOTE — TELEPHONE ENCOUNTER
The specialty pharmacy should have the copay cards - I know the rep contacted them in order for them to have them at the pharmacy. Do they not have them?

## 2022-12-12 NOTE — TELEPHONE ENCOUNTER
Central Prior Authorization Team   Phone: [phone]      PA Initiation    Medication: Tapinarof (VTAMA) 1 % CREA - INITIATED  Insurance Company: [employer] - Phone [phone] Fax [phone]  Pharmacy Filling the Rx: [facility] - [place], [place] - [facility] [address]  Filling Pharmacy Phone: [phone]  Filling Pharmacy Fax:    Start Date: 12/12/2022

## 2022-12-12 NOTE — TELEPHONE ENCOUNTER
PRIOR AUTHORIZATION DENIED    Medication: Tapinarof (VTAMA) 1 % CREA - PA DENIED    Denial Date: 12/12/2022    Denial Rational: PATIENT MUST TRY/FAIL ONE TOPICAL VITAMIN D ANALOG      Appeal Information: IF PROVIDER WOULD LIKE TO APPEAL THIS DECISION PLEASE PROVIDE PA TEAM WITH LETTER OF MEDICAL NECESSITY

## 2022-12-13 RX ORDER — TAPINAROF 10 MG/1000MG
1 CREAM TOPICAL DAILY
Qty: 60 G | Refills: 5 | Status: SHIPPED | OUTPATIENT
Start: 2022-12-13

## 2022-12-13 NOTE — TELEPHONE ENCOUNTER
Called and spoke with pharmacy, they do not have the copay card. Contacting rep to contact pharmacy to try to work this out for the patient.    Thank you,  Kathy GARCIA RN  Dermatology   [phone]

## 2023-01-22 ENCOUNTER — HEALTH MAINTENANCE LETTER (OUTPATIENT)
Age: 58
End: 2023-01-22

## 2023-04-10 ENCOUNTER — OFFICE VISIT (OUTPATIENT)
Dept: ORTHOPEDICS | Facility: CLINIC | Age: 58
End: 2023-04-10
Payer: COMMERCIAL

## 2023-04-10 ENCOUNTER — ANCILLARY PROCEDURE (OUTPATIENT)
Dept: GENERAL RADIOLOGY | Facility: CLINIC | Age: 58
End: 2023-04-10
Attending: STUDENT IN AN ORGANIZED HEALTH CARE EDUCATION/TRAINING PROGRAM
Payer: COMMERCIAL

## 2023-04-10 VITALS — BODY MASS INDEX: 26.73 KG/M2 | WEIGHT: 215 LBS | HEIGHT: 75 IN

## 2023-04-10 DIAGNOSIS — M75.42 ROTATOR CUFF IMPINGEMENT SYNDROME OF LEFT SHOULDER: ICD-10-CM

## 2023-04-10 DIAGNOSIS — M25.512 ACUTE PAIN OF LEFT SHOULDER: Primary | ICD-10-CM

## 2023-04-10 DIAGNOSIS — M75.92 SUPRASPINATUS TENDINITIS, LEFT: ICD-10-CM

## 2023-04-10 DIAGNOSIS — M25.512 ACUTE PAIN OF LEFT SHOULDER: ICD-10-CM

## 2023-04-10 PROCEDURE — 99214 OFFICE O/P EST MOD 30 MIN: CPT | Performed by: STUDENT IN AN ORGANIZED HEALTH CARE EDUCATION/TRAINING PROGRAM

## 2023-04-10 PROCEDURE — 73030 X-RAY EXAM OF SHOULDER: CPT | Mod: TC | Performed by: RADIOLOGY

## 2023-04-10 NOTE — PATIENT INSTRUCTIONS
1. Acute pain of left shoulder    2. Rotator cuff impingement syndrome of left shoulder    3. Supraspinatus tendinitis, left      Chaparro Hanson is a 58 year old male presenting for evaluation of acute left shoulder pain x 2-3 months without injury. History, examination and imaging are consistent with rotator cuff impingement and tendinitis of the supraspinatus and infraspinatus tendons. Reviewed treatment plan inclusive of pain management (topicals, NSAIDS, steroid injection), activity modification (avoiding painful activities), formal physical therapy and timing of advance imaging (ie MRI).      At this time, plan to proceed with the following:  - Diclofenac 50 mg tabs prescribed. Take 1 tab with food every 12 hours (breakfast and dinner) for the next 7-14 days, then reduce to as-needed. This is a prescription-strength non-steroidal anti-inflammatory (NSAID) medication. Do not use any other NSAIDS (ie ibuprofen products) while taking this medication. Stop this medication if you have stomach upset.  - You may also take Tylenol 1000 mg up to 3 times per day as needed for pain.    - Ice the shoulder for 10-15 minutes after exercises, up to 3-4 times per day as needed.   - Activity modifications: avoid activities that provoke symptoms.     - Referral placed for formal physical therapy. Please call [phone] to schedule. Exercises to include pain free range of motion of the shoulder, scapular stabilization, stretching of the shoulder capsule, strengthening of the periscapular, rotator cuff, and deltoid muscles, with progression to functional rehabilitation with home exercise prescription.    Please schedule a follow up appointment after about 6 weeks of physical therapy, or sooner as needed for persistence or worsening of pain. If symptoms are not improving, may consider advanced imaging (MRI) or cortisone injection. You may call our direct clinic number ([phone]) at any time with questions or  concerns.    Dorcas Clement MD, CAQSM  [facility]

## 2023-04-10 NOTE — LETTER
4/10/2023         RE: Chaparro Hanson  [address]  [facility] [address]        Dear Colleague,    Thank you for referring your patient, Chaparro Hanson, to the [facility] [place]. Please see a copy of my visit note below.    ASSESSMENT & PLAN    1. Acute pain of left shoulder    2. Rotator cuff impingement syndrome of left shoulder    3. Supraspinatus tendinitis, left      Chaparro Hanson is a 58 year old right handed male presenting for evaluation of acute left shoulder pain x 2-3 months without injury. History, examination and X-ray imaging are consistent with supraspinatus and infraspinatus tendinitis in the setting of rotator cuff impingement with a small subacromial osteophyte. Low suspicion for significant/surgical rotator cuff tear with intact strength on exam today. We reviewed treatment plan inclusive of pain management (topicals, NSAIDS, steroid injection), activity modification (avoiding painful activities), formal physical therapy and timing of advance imaging (ie MRI).      At this time, plan to proceed with the following:  - Diclofenac 50 mg tabs prescribed. Take 1 tab with food every 12 hours (breakfast and dinner) for the next 7-14 days, then reduce to as-needed. This is a prescription-strength non-steroidal anti-inflammatory (NSAID) medication. Do not use any other NSAIDS (ie ibuprofen products) while taking this medication. Stop this medication if you have stomach upset.  - You may also take Tylenol 1000 mg up to 3 times per day as needed for pain.    - Ice the shoulder for 10-15 minutes after exercises, up to 3-4 times per day as needed.   - Activity modifications: avoid activities that provoke symptoms.     - Referral placed for formal physical therapy. Please call [phone] to schedule. Exercises to include pain free range of motion of the shoulder, scapular stabilization, stretching of the shoulder capsule, strengthening of the periscapular, rotator cuff,  and deltoid muscles, with progression to functional rehabilitation with home exercise prescription.    Please schedule a follow up appointment after about 6 weeks of physical therapy, or sooner as needed for persistence or worsening of pain. If symptoms are not improving, may consider advanced imaging (MRI) or cortisone injection. You may call our direct clinic number ([phone]) at any time with questions or concerns.    Dorcas Clement MD, [facility]    -----    SUBJECTIVE  Chaparro Hanson is a/an 58 year old Right handed male who is seen as a self referral for evaluation of left shoulder pain. The patient is seen by themselves.    Onset: 2-3 month(s) ago. Reports insidious onset without acute precipitating event.  Location of Pain: left superior / lateral shoulder  Rating of Pain at worst: 8/10  Rating of Pain Currently: 4-5/10  Worsened by: shoulder abduction, push ups, sleeping on left side  Better with: rest / activity avoidance  Treatments tried: rest/activity avoidance and chiropractic care  Associated symptoms: no weakness, distal numbness or tingling; denies swelling or warmth  Orthopedic history: NO  Relevant surgical history: NO  Social history: Works - computer job    Past Medical History:   Diagnosis Date     Celiac disease     in my 40's ([id number])     Social History     Socioeconomic History     Marital status:      Spouse name: Cinthya     Number of children: 0   Tobacco Use     Smoking status: Never     Smokeless tobacco: Never   Substance and Sexual Activity     Alcohol use: Yes     Comment: Occasionally     Drug use: No     Sexual activity: Yes     Partners: Female     Patient's past medical, surgical, social, and family histories were reviewed today and no changes are noted.    REVIEW OF SYSTEMS:  10 point ROS is negative other than symptoms noted above in HPI, Past Medical History or as stated below  Constitutional: NEGATIVE for fever,  "chills, change in weight  Skin: NEGATIVE for worrisome rashes, moles or lesions  GI/: NEGATIVE for bowel or bladder changes  Neuro: NEGATIVE for weakness, dizziness or paresthesias    OBJECTIVE:  Ht 1.905 m (6' 3\")   Wt 97.5 kg (215 lb)   BMI 26.87 kg/m     General: healthy, alert and in no distress  HEENT: no scleral icterus or conjunctival erythema  Skin: no suspicious lesions or rash. No jaundice.  CV: regular rhythm by palpation  Resp: normal respiratory effort without conversational dyspnea   Psych: normal mood and affect  Gait: normal steady gait with appropriate coordination and balance  Neuro: normal light touch sensory exam of the bilateral upper extremities.    MSK:  LEFT SHOULDER  Inspection:    No swelling, bruising, discoloration, or obvious deformity or asymmetry  Palpation:    Tender about the anterior capsule, greater tuberosity, supraspinatus insertion and infraspinatus insertion. Minimal AC joint tenderness. Remainder of bony and tendinous landmarks are nontender.  Active Range of Motion:     Abduction 1700 limited by tightness, FF 1700 limited by tightness, , IR T10.      Scapular dyskinesis absent  Strength:    Scapular plane abduction 5-/5 limited by pain,  ER 5-/5 limited by pain, IR 5/5, biceps 5/5, triceps 5/5  Special Tests:    Positive: Neer's, Hernandez' and drop arm/painful arc    Negative: Supraspinatus (empty can), lift-off and Speed's    Independent visualization of the below image:    XR SHOULDER LEFT 4/10/23  Per my review, minimal degenerative changes of the glenohumeral and acromioclavicular joint with small subacromial osteophyte. No acute fracture or dislocation. Awaiting radiology review.      Dorcas Clement MD, [facility]        Again, thank you for allowing me to participate in the care of your patient.        Sincerely,        Dorcas Clement MD    "

## 2023-04-10 NOTE — PROGRESS NOTES
ASSESSMENT & PLAN    1. Acute pain of left shoulder    2. Rotator cuff impingement syndrome of left shoulder    3. Supraspinatus tendinitis, left      Chaparro Hanson is a 58 year old right handed male presenting for evaluation of acute left shoulder pain x 2-3 months without injury. History, examination and X-ray imaging are consistent with supraspinatus and infraspinatus tendinitis in the setting of rotator cuff impingement with a small subacromial osteophyte. Low suspicion for significant/surgical rotator cuff tear with intact strength on exam today. We reviewed treatment plan inclusive of pain management (topicals, NSAIDS, steroid injection), activity modification (avoiding painful activities), formal physical therapy and timing of advance imaging (ie MRI).      At this time, plan to proceed with the following:  - Diclofenac 50 mg tabs prescribed. Take 1 tab with food every 12 hours (breakfast and dinner) for the next 7-14 days, then reduce to as-needed. This is a prescription-strength non-steroidal anti-inflammatory (NSAID) medication. Do not use any other NSAIDS (ie ibuprofen products) while taking this medication. Stop this medication if you have stomach upset.  - You may also take Tylenol 1000 mg up to 3 times per day as needed for pain.    - Ice the shoulder for 10-15 minutes after exercises, up to 3-4 times per day as needed.   - Activity modifications: avoid activities that provoke symptoms.     - Referral placed for formal physical therapy. Please call [phone] to schedule. Exercises to include pain free range of motion of the shoulder, scapular stabilization, stretching of the shoulder capsule, strengthening of the periscapular, rotator cuff, and deltoid muscles, with progression to functional rehabilitation with home exercise prescription.    Please schedule a follow up appointment after about 6 weeks of physical therapy, or sooner as needed for persistence or worsening of pain. If symptoms are not  "improving, may consider advanced imaging (MRI) or cortisone injection. You may call our direct clinic number ([phone]) at any time with questions or concerns.    Dorcas Clement MD, [facility]    -----    SUBJECTIVE  Chaparro Hanson is a/an 58 year old Right handed male who is seen as a self referral for evaluation of left shoulder pain. The patient is seen by themselves.    Onset: 2-3 month(s) ago. Reports insidious onset without acute precipitating event.  Location of Pain: left superior / lateral shoulder  Rating of Pain at worst: 8/10  Rating of Pain Currently: 4-5/10  Worsened by: shoulder abduction, push ups, sleeping on left side  Better with: rest / activity avoidance  Treatments tried: rest/activity avoidance and chiropractic care  Associated symptoms: no weakness, distal numbness or tingling; denies swelling or warmth  Orthopedic history: NO  Relevant surgical history: NO  Social history: Works - computer job    Past Medical History:   Diagnosis Date     Celiac disease     in my 40's ([id number])     Social History     Socioeconomic History     Marital status:      Spouse name: Cinthya     Number of children: 0   Tobacco Use     Smoking status: Never     Smokeless tobacco: Never   Substance and Sexual Activity     Alcohol use: Yes     Comment: Occasionally     Drug use: No     Sexual activity: Yes     Partners: Female     Patient's past medical, surgical, social, and family histories were reviewed today and no changes are noted.    REVIEW OF SYSTEMS:  10 point ROS is negative other than symptoms noted above in HPI, Past Medical History or as stated below  Constitutional: NEGATIVE for fever, chills, change in weight  Skin: NEGATIVE for worrisome rashes, moles or lesions  GI/: NEGATIVE for bowel or bladder changes  Neuro: NEGATIVE for weakness, dizziness or paresthesias    OBJECTIVE:  Ht 1.905 m (6' 3\")   Wt 97.5 kg (215 lb)   BMI 26.87 kg/m   "   General: healthy, alert and in no distress  HEENT: no scleral icterus or conjunctival erythema  Skin: no suspicious lesions or rash. No jaundice.  CV: regular rhythm by palpation  Resp: normal respiratory effort without conversational dyspnea   Psych: normal mood and affect  Gait: normal steady gait with appropriate coordination and balance  Neuro: normal light touch sensory exam of the bilateral upper extremities.    MSK:  LEFT SHOULDER  Inspection:    No swelling, bruising, discoloration, or obvious deformity or asymmetry  Palpation:    Tender about the anterior capsule, greater tuberosity, supraspinatus insertion and infraspinatus insertion. Minimal AC joint tenderness. Remainder of bony and tendinous landmarks are nontender.  Active Range of Motion:     Abduction 1700 limited by tightness, FF 1700 limited by tightness, , IR T10.      Scapular dyskinesis absent  Strength:    Scapular plane abduction 5-/5 limited by pain,  ER 5-/5 limited by pain, IR 5/5, biceps 5/5, triceps 5/5  Special Tests:    Positive: Neer's, Hernandez' and drop arm/painful arc    Negative: Supraspinatus (empty can), lift-off and Speed's    Independent visualization of the below image:    XR SHOULDER LEFT 4/10/23  Per my review, minimal degenerative changes of the glenohumeral and acromioclavicular joint with small subacromial osteophyte. No acute fracture or dislocation. Awaiting radiology review.      Dorcas Clement MD, CAQSM  [facility]

## 2023-05-18 ENCOUNTER — TELEPHONE (OUTPATIENT)
Dept: DERMATOLOGY | Facility: CLINIC | Age: 58
End: 2023-05-18
Payer: COMMERCIAL

## 2023-05-18 NOTE — TELEPHONE ENCOUNTER
Prior Authorization Specialty Medication Request    Medication/Dose: Vtama 1% cream   ICD code (if different than what is on RX):    Previously Tried and Failed:      Important Lab Values:   Rationale:     Insurance Name:   Insurance ID:   Insurance Phone Number:     Pharmacy Information (if different than what is on RX)  Name:    Phone:    Thank you   Lavern Fung [facility]

## 2023-05-22 ASSESSMENT — ACTIVITIES OF DAILY LIVING (ADL)
PUTTING_ON_A_SHIRT_THAT_BUTTONS_DOWN_THE_FRONT: 1
PLACING_AN_OBJECT_ON_A_HIGH_SHELF: 1
WASHING_YOUR_BACK: 2
PUTTING_ON_AN_UNDERSHIRT_OR_A_PULLOVER_SWEATER: 2
PUSHING_WITH_THE_INVOLVED_ARM: 1
REACHING_FOR_SOMETHING_ON_A_HIGH_SHELF: 3
REMOVING_SOMETHING_FROM_YOUR_BACK_POCKET: 2
WHEN_LYING_ON_THE_INVOLVED_SIDE: 6
PLEASE_INDICATE_YOR_PRIMARY_REASON_FOR_REFERRAL_TO_THERAPY:: SHOULDER
AT_ITS_WORST?: 6
CARRYING_A_HEAVY_OBJECT_OF_10_POUNDS: 1
WASHING_YOUR_HAIR?: 1
TOUCHING_THE_BACK_OF_YOUR_NECK: 1
PUTTING_ON_YOUR_PANTS: 0

## 2023-05-23 ENCOUNTER — THERAPY VISIT (OUTPATIENT)
Dept: PHYSICAL THERAPY | Facility: CLINIC | Age: 58
End: 2023-05-23
Payer: COMMERCIAL

## 2023-05-23 DIAGNOSIS — M75.92 SUPRASPINATUS TENDINITIS, LEFT: ICD-10-CM

## 2023-05-23 DIAGNOSIS — M75.42 ROTATOR CUFF IMPINGEMENT SYNDROME OF LEFT SHOULDER: ICD-10-CM

## 2023-05-23 DIAGNOSIS — M25.512 ACUTE PAIN OF LEFT SHOULDER: ICD-10-CM

## 2023-05-23 PROCEDURE — 97161 PT EVAL LOW COMPLEX 20 MIN: CPT | Mod: GP | Performed by: PHYSICAL THERAPIST

## 2023-05-23 PROCEDURE — 97110 THERAPEUTIC EXERCISES: CPT | Mod: GP | Performed by: PHYSICAL THERAPIST

## 2023-05-23 PROCEDURE — 97112 NEUROMUSCULAR REEDUCATION: CPT | Mod: GP | Performed by: PHYSICAL THERAPIST

## 2023-05-23 NOTE — PROGRESS NOTES
PHYSICAL THERAPY EVALUATION  Type of Visit: Evaluation    See electronic medical record for Abuse and Falls Screening details.    Subjective    Pt reports that he's been having pain in the left shoulder for a few months now without known cause. Pain is worst with pushups, certain positions. Denies vague symptoms. Would like to get rid of this pain and return to PLOF pain free. Wants to get back to upper body workouts pain free, sleeping on the left shoulder pain free.        Presenting condition or subjective complaint: Left shoulder pain  Date of onset: 23    Relevant medical history: Anemia; Migraines or headaches; Pain at night or rest   Dates & types of surgery:      Prior diagnostic imaging/testing results: X-ray     Prior therapy history for the same diagnosis, illness or injury: No      Prior Level of Function   Transfers: Independent  Ambulation: Independent  ADL: Independent  IADL: Childcare, Driving, Finances, Housekeeping, Laundry, Meal preparation, School, Work, Yard work    Living Environment  Social support: With a significant other or spouse   Type of home: Apartment/condo   Stairs to enter the home: No       Ramp: No   Stairs inside the home: No       Help at home: None  Equipment owned:       Employment: Yes   Hobbies/Interests: Music (piano, trombone), website/database development    Patient goals for therapy: Exercising (pushups), sleeping on left side    Pain assessment: Pain present  Location: top of the left shoulder/Ratin/10, at worst 7/10     Objective   Posture: Forward head, rounded shoulders    *=painful    Shoulder AROM (* = pain) Right Left           175    175   ER/HBH 70 70*   IR/HBB T7 T7     Shoulder PROM (* = pain) Right Left    175*    175*   ER In 90 abduction 90 In 90 abduction 90   IR In 90 abduction 70 In 90 abduction 65     Shoulder Strength (* = pain) Right Left   FL 5/5 5-/5*   ABD 5/5 4+/5*   ER (0  abduction) 5/5 4+/5*   ER (90 abduction) 5/5 4/5*   IR 5/5 5/5   Biceps 5/5 5/5   Middle Trapezius 5/5 4-/5*   Lower Trapezius 5/5 4-/5     Palpation tenderness: unremarkable    Other Tests: none      Assessment & Plan   CLINICAL IMPRESSIONS   Medical Diagnosis: Acute Left Shoulder pain    Treatment Diagnosis: Acute left shoulder pain   Impression/Assessment: Patient is a 58 year old male with left shoulder complaints.  The following significant findings have been identified: Pain, Decreased ROM/flexibility, Decreased joint mobility, Decreased strength, Impaired muscle performance, Decreased activity tolerance and Impaired posture. These impairments interfere with their ability to perform self care tasks, work tasks, recreational activities, household chores, driving , household mobility and community mobility as compared to previous level of function.     Clinical Decision Making (Complexity):   Clinical Presentation: Stable/Uncomplicated  Clinical Presentation Rationale: based on medical and personal factors listed in PT evaluation  Clinical Decision Making (Complexity): Low complexity    PLAN OF CARE  Treatment Interventions:  Interventions: Manual Therapy, Neuromuscular Re-education, Therapeutic Activity, Therapeutic Exercise, Self-Care/Home Management    Long Term Goals     PT Goal 1  Goal Identifier: Reaching  Goal Description: Pt will be able to reach overhead, out to the side, behind the back and cross body pain free in order to reach cabinets, for dressing, and ADls  Rationale: to maximize safety and independence with performance of ADLs and functional tasks;to maximize safety and independence within the home;to maximize safety and independence within the community;to maximize safety and independence with transportation;to maximize safety and independence with self cares  Target Date: 07/18/23  PT Goal 2  Goal Identifier: Lifting  Goal Description: Pt will be able to lift > 5 pounds overhead pain free in  order to place things into high spaces with ADLs at home, and perform daily tasks at home  Rationale: to maximize safety and independence with performance of ADLs and functional tasks;to maximize safety and independence within the home;to maximize safety and independence within the community;to maximize safety and independence with transportation;to maximize safety and independence with self cares  Target Date: 07/18/23      Frequency of Treatment: 1 x week  Duration of Treatment: 8 Weeks    Recommended Referrals to Other Professionals: none  Education Assessment:   Learner/Method: Patient;No Barriers to Learning  Education Comments: no concerns    Risks and benefits of evaluation/treatment have been explained.   Patient/Family/caregiver agrees with Plan of Care.     Evaluation Time:     PT Eval, Low Complexity Minutes (88968): 15    Signing Clinician: Rios Emanuel PT

## 2023-05-24 NOTE — TELEPHONE ENCOUNTER
Central Prior Authorization Team   Phone: [phone]    PA Initiation    Medication: Vtama 1% cream   Insurance Company: [employer] - Phone [phone] Fax [phone]  Pharmacy Filling the Rx: [facility] [place], [place] - [address]  Filling Pharmacy Phone: [phone]  Filling Pharmacy Fax:    Start Date: 5/24/2023

## 2023-05-24 NOTE — TELEPHONE ENCOUNTER
Prior Authorization Approval    Authorization Effective Date: 4/24/2023  Authorization Expiration Date: 5/23/2024  Medication: Vtama 1% cream   Approved Dose/Quantity:    Reference #:     Insurance Company: [employer] - Phone [phone] Fax [phone]  Expected CoPay:       CoPay Card Available:      Foundation Assistance Needed:    Which Pharmacy is filling the prescription (Not needed for infusion/clinic administered): [facility] [address] [facility]  Pharmacy Notified: Yes  Patient Notified: Yes  **Instructed pharmacy to notify patient when script is ready to /ship.**

## 2023-06-28 ENCOUNTER — OFFICE VISIT (OUTPATIENT)
Dept: PODIATRY | Facility: CLINIC | Age: 58
End: 2023-06-28
Payer: COMMERCIAL

## 2023-06-28 VITALS — BODY MASS INDEX: 26.87 KG/M2 | DIASTOLIC BLOOD PRESSURE: 78 MMHG | SYSTOLIC BLOOD PRESSURE: 118 MMHG | WEIGHT: 215 LBS

## 2023-06-28 DIAGNOSIS — B35.1 ONYCHOMYCOSIS OF TOENAIL: Primary | ICD-10-CM

## 2023-06-28 PROCEDURE — 99203 OFFICE O/P NEW LOW 30 MIN: CPT | Performed by: PODIATRIST

## 2023-06-28 RX ORDER — CICLOPIROX OLAMINE 7.7 MG/G
CREAM TOPICAL DAILY
Qty: 30 G | Refills: 6 | Status: SHIPPED | OUTPATIENT
Start: 2023-06-28

## 2023-06-28 NOTE — PROGRESS NOTES
PATIENT HISTORY:  Chaparro Hanson is a 58 year old male who presents to clinic for discoloration to right second toenail.  Notes its been going on for about 6 months.  He has tried using over-the-counter antifungals but it has not really helped.  He does not remember injuring it but is not sure.  Wondering what can be done for the toenail.    Review of Systems:  Patient denies fever, chills, rash, wound, stiffness, limping, numbness, weakness, heart burn, blood in stool, chest pain with activity, calf pain when walking, shortness of breath with activity, chronic cough, easy bleeding/bruising, swelling of ankles, excessive thirst, fatigue, depression, anxiety.       PAST MEDICAL HISTORY:   Past Medical History:   Diagnosis Date     Celiac disease     in my 40's ([id number])        PAST SURGICAL HISTORY:   Past Surgical History:   Procedure Laterality Date     COLONOSCOPY N/A 11/6/2014    Procedure: COLONOSCOPY;  Surgeon: Aldo Irving MD;  Location:  GI     ENDOSCOPY  2011     TONSILLECTOMY  age 7        MEDICATIONS:   Current Outpatient Medications:      Cholecalciferol (VITAMIN D3 PO), Take by mouth daily, Disp: , Rfl:      fish oil-omega-3 fatty acids 1000 MG capsule, Take 1 g by mouth daily, Disp: , Rfl:      omeprazole (PRILOSEC OTC) 20 MG EC tablet, Take 20 mg by mouth daily, Disp: , Rfl:      SUMAtriptan (IMITREX) 100 MG tablet, Take by mouth at onset of headache for migraine, Disp: , Rfl:      Tapinarof (VTAMA) 1 % CREA, Externally apply 1 Application topically daily, Disp: 60 g, Rfl: 5     augmented betamethasone dipropionate (DIPROLENE-AF) 0.05 % external cream, Apply to AA BID x 1-2 weeks then PRN only. Do not apply to face (Patient not taking: Reported on 6/28/2023), Disp: 50 g, Rfl: 2     biotin 1000 MCG TABS tablet, Take 1,000 mcg by mouth daily, Disp: , Rfl:      cetirizine (ZYRTEC) 10 MG tablet, Take 1 tablet (10 mg) by mouth daily, Disp: 90 tablet, Rfl: 3     diclofenac (VOLTAREN) 50 MG EC  tablet, Take 1 tablet (50 mg) by mouth 2 times daily for 14 days Always take with food. Do not use with any other NSAIDS like ibuprofen., Disp: 28 tablet, Rfl: 0     guaiFENesin 1200 MG TB12, Take 1 tablet (1,200 mg) by mouth 2 times daily, Disp: 60 tablet, Rfl: 0     ALLERGIES:    Allergies   Allergen Reactions     Penicillins         SOCIAL HISTORY:   Social History     Socioeconomic History     Marital status:      Spouse name: Cinthya     Number of children: 0     Years of education: Not on file     Highest education level: Not on file   Occupational History     Not on file   Tobacco Use     Smoking status: Never     Smokeless tobacco: Never   Substance and Sexual Activity     Alcohol use: Yes     Comment: Occasionally     Drug use: No     Sexual activity: Yes     Partners: Female   Other Topics Concern     Parent/sibling w/ CABG, MI or angioplasty before 65F 55M? Not Asked   Social History Narrative     Not on file     Social Determinants of Health     Financial Resource Strain: Not on file   Food Insecurity: Not on file   Transportation Needs: Not on file   Physical Activity: Not on file   Stress: Not on file   Social Connections: Not on file   Intimate Partner Violence: Not on file   Housing Stability: Not on file        FAMILY HISTORY:   Family History   Problem Relation Age of Onset     Cancer Father 78        Prostate      Cancer - colorectal No family hx of         EXAM:Vitals: /78   Wt 97.5 kg (215 lb)   BMI 26.87 kg/m    BMI= Body mass index is 26.87 kg/m .    General appearance: Patient is alert and fully cooperative with history & exam.  No sign of distress is noted during the visit.     Psychiatric: Affect is pleasant & appropriate.  Patient appears motivated to improve health.     Respiratory: Breathing is regular & unlabored while sitting.     HEENT: Hearing is intact to spoken word.  Speech is clear.  No gross evidence of visual impairment that would impact ambulation.     Dermatologic:  Dried subungual hematoma noted under the right second toe.  No darkening of the skin is noted.     Vascular: DP & PT pulses are intact & regular bilaterally.  No significant edema or varicosities noted.  CFT and skin temperature is normal to both lower extremities.     Neurologic: Lower extremity sensation is intact to light touch.  No evidence of weakness or contracture in the lower extremities.  No evidence of neuropathy.     Musculoskeletal: Patient is ambulatory without assistive device or brace.  No gross ankle deformity noted.  No foot or ankle joint effusion is noted.     ASSESSMENT: Onychomycosis of toenail     Medical Decision Making/Plan:  Reviewed patient's chart in epic. Discussed causes and treatments of nail fungus.  Explained that even if a culture comes back negative, a patient could still have nail fungus.  Discussed treatment options with patient and explained that there isn't one treatment that is 100% effective.  Discussed oral lamisil which is the most effective at about 70% but which can have liver effects.  Explained that if she wanted to try this that she would need serial blood draws to test her liver function.  Discussed over the counter antifungal creams.  Explained that these are about 50% effective and need to be applied once a day for about 6-8months.  Also talked about prescription penlac which is a nail laquer.  Again this is also only 50% effective.  Also discussed that if there was damage to the nail and the nail is now dystrophic that non of the above is going to change the nail.  If there was damage, there is note anything that can be done for the nail to correct it.  Discussed that if it becomes painful, we can remove the nail in clinic.        At this time, was given an order for antifungal cream.  He will apply this daily for the next 6 to 12 months.  If it does not improve after that could try biopsying it but it does appear to have some clear space at the proximal nail bed  noted.  All questions were answered to patient's satisfaction and he will call further questions or concerns.      Patient risk factor: Patient is at low risk for infection.        Marika Baez DPM, Podiatry/Foot and Ankle Surgery

## 2023-06-28 NOTE — PATIENT INSTRUCTIONS
Thank you for choosing [facility]!    DR CRANDALL'S CLINIC:  [facility]   [address]   [place] [address]      TRIAGE LINE: [phone]  APPOINTMENTS: [phone]  RADIOLOGY: [phone]  SET UP SURGERY: [phone]  PHYSICAL THERAPY: [phone]   FAX NUMBER: [phone]  BILLING QUESTIONS: [phone]       Follow up: as needed.       NAIL FUNGUS / ONYCHOMYCOSIS   Nail fungus is not a hygiene problem and will likely not lead to significant medical problems. The nails may get thick causing pain and possibly local skin infection. Treatments include debridement (trimming), oral antifungals, topical antifungals and complete removal of the nail. Most fungal nails are not treated.     Topicals such as tea tree oil can be helpful for surface fungus and may, at best, limit progression. Over the counter creams (such as Lamisil) can also be used however, their effectiveness is also quite low.  Topical treatment with Pen lac is expensive and often not covered by insurance. Pen lac has an approximate 8% success rate. Topical therapy recommendations is to apply twice a day for at least 3-4 months as it takes 9 months for new nail to grow out.     Experts suggest soaking your feet for 15 to 20 minutes in a mixture of 1 cup vinegar to 4 cups warm water. Be sure to rinse well and pat your feet dry when you're done. You can soak your feet like this daily. But if your skin becomes irritated, try soaking only two to three times a week. Vicks VapoRub, as with vinegar, there have been no controlled clinical trials to assess the effectiveness of Vicks VapoRub on nail fungus, but there have been numerous anecdotal reports that it works. There's no consensus on how often to apply this product, so check with your doctor before using it on your nails.      Oral therapies include Sporanox and Lamisil. Oral therapies are also expensive and not very effective. Side  effects such as liver disease are the main concern. Return of fungus is common even if the treatment worked.      Other Tips:  - Penlac nail medication apply daily x 4 months; remove old polish first day of each week  - Antifungal cream/powder (Zeasorb) - apply daily to feet and shoes x 2 months  - Clean shoes with Lysol or in washing machine every few weeks  - Rotate shoe gear; give them 24 hours to dry out between days wearing them  - Clean pair of socks in morning, clean pair in afternoon if your feet sweat  - Shower shoes used in public showers/pools

## 2023-06-28 NOTE — LETTER
6/28/2023         RE: Chaparro Hanson  [address]  [facility] [address]        Dear Colleague,    Thank you for referring your patient, Chaparro Hanson, to the [facility]. Please see a copy of my visit note below.    PATIENT HISTORY:  Chaparro Hanson is a 58 year old male who presents to clinic for discoloration to right second toenail.  Notes its been going on for about 6 months.  He has tried using over-the-counter antifungals but it has not really helped.  He does not remember injuring it but is not sure.  Wondering what can be done for the toenail.    Review of Systems:  Patient denies fever, chills, rash, wound, stiffness, limping, numbness, weakness, heart burn, blood in stool, chest pain with activity, calf pain when walking, shortness of breath with activity, chronic cough, easy bleeding/bruising, swelling of ankles, excessive thirst, fatigue, depression, anxiety.       PAST MEDICAL HISTORY:   Past Medical History:   Diagnosis Date     Celiac disease     in my 40's ([id number])        PAST SURGICAL HISTORY:   Past Surgical History:   Procedure Laterality Date     COLONOSCOPY N/A 11/6/2014    Procedure: COLONOSCOPY;  Surgeon: Aldo Irving MD;  Location:  GI     ENDOSCOPY  2011     TONSILLECTOMY  age 7        MEDICATIONS:   Current Outpatient Medications:      Cholecalciferol (VITAMIN D3 PO), Take by mouth daily, Disp: , Rfl:      fish oil-omega-3 fatty acids 1000 MG capsule, Take 1 g by mouth daily, Disp: , Rfl:      omeprazole (PRILOSEC OTC) 20 MG EC tablet, Take 20 mg by mouth daily, Disp: , Rfl:      SUMAtriptan (IMITREX) 100 MG tablet, Take by mouth at onset of headache for migraine, Disp: , Rfl:      Tapinarof (VTAMA) 1 % CREA, Externally apply 1 Application topically daily, Disp: 60 g, Rfl: 5     augmented betamethasone dipropionate (DIPROLENE-AF) 0.05 % external cream, Apply to AA BID x 1-2 weeks then PRN only. Do not apply to face (Patient not taking: Reported  on 6/28/2023), Disp: 50 g, Rfl: 2     biotin 1000 MCG TABS tablet, Take 1,000 mcg by mouth daily, Disp: , Rfl:      cetirizine (ZYRTEC) 10 MG tablet, Take 1 tablet (10 mg) by mouth daily, Disp: 90 tablet, Rfl: 3     diclofenac (VOLTAREN) 50 MG EC tablet, Take 1 tablet (50 mg) by mouth 2 times daily for 14 days Always take with food. Do not use with any other NSAIDS like ibuprofen., Disp: 28 tablet, Rfl: 0     guaiFENesin 1200 MG TB12, Take 1 tablet (1,200 mg) by mouth 2 times daily, Disp: 60 tablet, Rfl: 0     ALLERGIES:    Allergies   Allergen Reactions     Penicillins         SOCIAL HISTORY:   Social History     Socioeconomic History     Marital status:      Spouse name: Cinthya     Number of children: 0     Years of education: Not on file     Highest education level: Not on file   Occupational History     Not on file   Tobacco Use     Smoking status: Never     Smokeless tobacco: Never   Substance and Sexual Activity     Alcohol use: Yes     Comment: Occasionally     Drug use: No     Sexual activity: Yes     Partners: Female   Other Topics Concern     Parent/sibling w/ CABG, MI or angioplasty before 65F 55M? Not Asked   Social History Narrative     Not on file     Social Determinants of Health     Financial Resource Strain: Not on file   Food Insecurity: Not on file   Transportation Needs: Not on file   Physical Activity: Not on file   Stress: Not on file   Social Connections: Not on file   Intimate Partner Violence: Not on file   Housing Stability: Not on file        FAMILY HISTORY:   Family History   Problem Relation Age of Onset     Cancer Father 78        Prostate      Cancer - colorectal No family hx of         EXAM:Vitals: /78   Wt 97.5 kg (215 lb)   BMI 26.87 kg/m    BMI= Body mass index is 26.87 kg/m .    General appearance: Patient is alert and fully cooperative with history & exam.  No sign of distress is noted during the visit.     Psychiatric: Affect is pleasant & appropriate.  Patient  appears motivated to improve health.     Respiratory: Breathing is regular & unlabored while sitting.     HEENT: Hearing is intact to spoken word.  Speech is clear.  No gross evidence of visual impairment that would impact ambulation.     Dermatologic: Dried subungual hematoma noted under the right second toe.  No darkening of the skin is noted.     Vascular: DP & PT pulses are intact & regular bilaterally.  No significant edema or varicosities noted.  CFT and skin temperature is normal to both lower extremities.     Neurologic: Lower extremity sensation is intact to light touch.  No evidence of weakness or contracture in the lower extremities.  No evidence of neuropathy.     Musculoskeletal: Patient is ambulatory without assistive device or brace.  No gross ankle deformity noted.  No foot or ankle joint effusion is noted.     ASSESSMENT: Onychomycosis of toenail     Medical Decision Making/Plan:  Reviewed patient's chart in epic. Discussed causes and treatments of nail fungus.  Explained that even if a culture comes back negative, a patient could still have nail fungus.  Discussed treatment options with patient and explained that there isn't one treatment that is 100% effective.  Discussed oral lamisil which is the most effective at about 70% but which can have liver effects.  Explained that if she wanted to try this that she would need serial blood draws to test her liver function.  Discussed over the counter antifungal creams.  Explained that these are about 50% effective and need to be applied once a day for about 6-8months.  Also talked about prescription penlac which is a nail laquer.  Again this is also only 50% effective.  Also discussed that if there was damage to the nail and the nail is now dystrophic that non of the above is going to change the nail.  If there was damage, there is note anything that can be done for the nail to correct it.  Discussed that if it becomes painful, we can remove the nail in  clinic.        At this time, was given an order for antifungal cream.  He will apply this daily for the next 6 to 12 months.  If it does not improve after that could try biopsying it but it does appear to have some clear space at the proximal nail bed noted.  All questions were answered to patient's satisfaction and he will call further questions or concerns.      Patient risk factor: Patient is at low risk for infection.        Marika Baez DPM, Podiatry/Foot and Ankle Surgery        Again, thank you for allowing me to participate in the care of your patient.        Sincerely,        Marika Baez DPM, Podiatry/Foot and Ankle Surgery

## 2023-07-05 PROBLEM — M25.512 ACUTE PAIN OF LEFT SHOULDER: Status: RESOLVED | Noted: 2023-05-23 | Resolved: 2023-07-05

## 2024-02-18 ENCOUNTER — HEALTH MAINTENANCE LETTER (OUTPATIENT)
Age: 59
End: 2024-02-18

## 2024-04-21 NOTE — TELEPHONE ENCOUNTER
REFERRAL INFORMATION:    Referring Provider:  Dr. Miky Messer     Referring Clinic:  [facility]     Reason for Visit/Diagnosis: Celiac      FUTURE VISIT INFORMATION:    Appointment Date: 4/13/2021    Appointment Time: 1 PM      NOTES STATUS DETAILS   OFFICE NOTE from Referring Provider Internal 10/12/2020, 9/1/15 Office visit with Dr. Messer      OFFICE NOTE from Other Specialist Internal 4/3/18 Office visit with Yesenia Aranda PA-C ([facility])    HOSPITAL DISCHARGE SUMMARY/  ED VISITS N/A    OPERATIVE REPORT N/A    MEDICATION LIST Internal         ENDOSCOPY  N/A    COLONOSCOPY Internal 11/6/14   ERCP N/A    EUS N/A    STOOL TESTING N/A    PERTINENT LABS Internal    PATHOLOGY REPORTS (RELATED) N/A    IMAGING (CT, MRI, EGD, MRCP, Small Bowel Follow Through/SBT, MR/CT Enterography) Internal US Abdomen: 10/29/2020, 8/27/15            medium/soft

## 2025-03-09 ENCOUNTER — HEALTH MAINTENANCE LETTER (OUTPATIENT)
Age: 60
End: 2025-03-09